# Patient Record
Sex: FEMALE | Race: WHITE | Employment: FULL TIME | URBAN - METROPOLITAN AREA
[De-identification: names, ages, dates, MRNs, and addresses within clinical notes are randomized per-mention and may not be internally consistent; named-entity substitution may affect disease eponyms.]

---

## 2018-03-15 NOTE — PROGRESS NOTES
SUBJECTIVE:   Radha Navarro is a 37 year old female who presents to clinic today for the following health issues:    Chief Complaint   Patient presents with     Derm Problem     rash prefers cream RX to pill     Contraception     discuss IUD       Rash  Onset: 1 year    Description:   Location: left lower leg  Character: blotchy, red  Itching (Pruritis): YES    Progression of Symptoms:  same and intermittent    Accompanying Signs & Symptoms:  Fever: no   Body aches or joint pain: no   Sore throat symptoms: no   Recent cold symptoms: no     History:   Previous similar rash: YES    Precipitating factors:   Exposure to similar rash: no   New exposures: None   Recent travel: no     Alleviating factors:  Salt baths    Therapies Tried and outcome: cortisone cream,salt baths        Problem list and histories reviewed & adjusted, as indicated.  Additional history: as documented    Patient Active Problem List   Diagnosis     CARDIOVASCULAR SCREENING; LDL GOAL LESS THAN 160     HDL lipoprotein deficiency     Ganglion cyst of wrist, Left side, recurrent     Migraines     Cervical pain     Headache     Transient insomnia     Past Surgical History:   Procedure Laterality Date     HC EXCIS PRIMARY GANGLION WRIST      x2 on left wrist; x1 on right side (2011)     TONSILLECTOMY & ADENOIDECTOMY  Age 23       Social History   Substance Use Topics     Smoking status: Former Smoker     Packs/day: 0.00     Types: Cigarettes     Quit date: 8/16/2013     Smokeless tobacco: Never Used     Alcohol use No      Comment: SOBER SINCE 9TH GRADE     Family History   Problem Relation Age of Onset     Lipids Father      DIABETES Maternal Grandmother      DIABETES Maternal Grandfather      DIABETES Paternal Grandmother      DIABETES Paternal Grandfather      CANCER Mother      Breast Cancer Maternal Aunt      CANCER Other      cousin has cervical cancer/cousin has cervical cancer     CANCER Maternal Aunt      cervical cancer     CANCER Maternal  Aunt      cervical cancer           Reviewed and updated as needed this visit by clinical staff  Tobacco  Allergies  Meds  Med Hx  Surg Hx  Fam Hx  Soc Hx      Reviewed and updated as needed this visit by Provider         ROS:  Constitutional, skin systems are negative, except as otherwise noted.    OBJECTIVE:                                                    /81 (BP Location: Left arm, Patient Position: Sitting, Cuff Size: Adult Regular)  Pulse 74  Temp 98.1  F (36.7  C) (Tympanic)  Resp 16  Wt 142 lb (64.4 kg)  SpO2 100%  BMI 23.45 kg/m2  Body mass index is 23.45 kg/(m^2).  GENERAL APPEARANCE: healthy, alert and no distress  SKIN: an erythematous plaque of the lateral left lower leg  PSYCH: mentation appears normal and affect normal/bright       ASSESSMENT:                                                      1. Dermatitis    2. Encounter for other general counseling or advice on contraception         PLAN:                                                    TCS for presumptive eczema plaque. Discussed treatment and prevention of eczema today. She has a history of possible allergies and family history of eczema.    Referral for IUD placement.     The patient was in agreement with the plan today and had no questions or concerns prior to leaving the clinic.     Dona Murillo PA-C  [facility]

## 2018-03-16 ENCOUNTER — OFFICE VISIT (OUTPATIENT)
Dept: FAMILY MEDICINE | Facility: CLINIC | Age: 38
End: 2018-03-16
Payer: COMMERCIAL

## 2018-03-16 VITALS
TEMPERATURE: 98.1 F | HEART RATE: 74 BPM | BODY MASS INDEX: 23.45 KG/M2 | SYSTOLIC BLOOD PRESSURE: 122 MMHG | OXYGEN SATURATION: 100 % | RESPIRATION RATE: 16 BRPM | WEIGHT: 142 LBS | DIASTOLIC BLOOD PRESSURE: 81 MMHG

## 2018-03-16 DIAGNOSIS — L30.9 DERMATITIS: Primary | ICD-10-CM

## 2018-03-16 DIAGNOSIS — Z30.09 ENCOUNTER FOR OTHER GENERAL COUNSELING OR ADVICE ON CONTRACEPTION: ICD-10-CM

## 2018-03-16 PROCEDURE — 99213 OFFICE O/P EST LOW 20 MIN: CPT | Performed by: PHYSICIAN ASSISTANT

## 2018-03-16 RX ORDER — MOMETASONE FUROATE 1 MG/G
CREAM TOPICAL
Qty: 45 G | Refills: 1 | Status: SHIPPED | OUTPATIENT
Start: 2018-03-16 | End: 2020-07-28

## 2018-03-16 ASSESSMENT — ANXIETY QUESTIONNAIRES
GAD7 TOTAL SCORE: 0
2. NOT BEING ABLE TO STOP OR CONTROL WORRYING: NOT AT ALL
6. BECOMING EASILY ANNOYED OR IRRITABLE: NOT AT ALL
5. BEING SO RESTLESS THAT IT IS HARD TO SIT STILL: NOT AT ALL
7. FEELING AFRAID AS IF SOMETHING AWFUL MIGHT HAPPEN: NOT AT ALL
3. WORRYING TOO MUCH ABOUT DIFFERENT THINGS: NOT AT ALL
IF YOU CHECKED OFF ANY PROBLEMS ON THIS QUESTIONNAIRE, HOW DIFFICULT HAVE THESE PROBLEMS MADE IT FOR YOU TO DO YOUR WORK, TAKE CARE OF THINGS AT HOME, OR GET ALONG WITH OTHER PEOPLE: NOT DIFFICULT AT ALL
1. FEELING NERVOUS, ANXIOUS, OR ON EDGE: NOT AT ALL

## 2018-03-16 ASSESSMENT — PATIENT HEALTH QUESTIONNAIRE - PHQ9: 5. POOR APPETITE OR OVEREATING: NOT AT ALL

## 2018-03-16 NOTE — NURSING NOTE
"Chief Complaint   Patient presents with     Derm Problem     rash prefers cream RX to pill     Contraception     discuss IUD       Initial /81 (BP Location: Left arm, Patient Position: Sitting, Cuff Size: Adult Regular)  Pulse 74  Temp 98.1  F (36.7  C) (Tympanic)  Resp 16  Wt 142 lb (64.4 kg)  SpO2 100%  BMI 23.45 kg/m2 Estimated body mass index is 23.45 kg/(m^2) as calculated from the following:    Height as of 8/16/16: 5' 5.25\" (1.657 m).    Weight as of this encounter: 142 lb (64.4 kg).  Medication Reconciliation: complete    "

## 2018-03-16 NOTE — MR AVS SNAPSHOT
After Visit Summary   3/16/2018    Radha Navarro    MRN: [id number]           Patient Information     Date Of Birth          1980        Visit Information        Provider Department      3/16/2018 12:40 PM Dona Murillo PA-C [facility]        Today's Diagnoses     Encounter for other general counseling or advice on contraception    -  1    Dermatitis           Follow-ups after your visit        Additional Services     OB/GYN REFERRAL       Your provider has referred you to:    FMG: [facility] [phone]   [email or web address]  FMG: [facility] [phone]   [email or web address]    Please be aware that coverage of these services is subject to the terms and limitations of your health insurance plan.  Call member services at your health plan with any benefit or coverage questions.      Please bring the following with you to your appointment:    (1) Any X-Rays, CTs or MRIs which have been performed.  Contact the facility where they were done to arrange for  prior to your scheduled appointment.   (2) List of current medications   (3) This referral request   (4) Any documents/labs given to you for this referral                  Who to contact     Normal or non-critical lab and imaging results will be communicated to you by MyChart, letter or phone within 4 business days after the clinic has received the results. If you do not hear from us within 7 days, please contact the clinic through MyChart or phone. If you have a critical or abnormal lab result, we will notify you by phone as soon as possible.  Submit refill requests through [employer]illa or call your pharmacy and they will forward the refill request to us. Please allow 3 business days for your refill to be completed.          If you need to speak with a  for additional information , please call: [phone]              Additional Information About Your Visit        MyChart Information     [employer]ap (formerly [employer]) gives you secure access to your electronic health record. If you see a primary care provider, you can also send messages to your care team and make appointments. If you have questions, please call your primary care clinic.  If you do not have a primary care provider, please call [phone] and they will assist you.        Care EveryWhere ID     This is your Care EveryWhere ID. This could be used by other organizations to access your [place] medical records  [id number]        Your Vitals Were     Pulse Temperature Respirations Pulse Oximetry BMI (Body Mass Index)       74 98.1  F (36.7  C) (Tympanic) 16 100% 23.45 kg/m2        Blood Pressure from Last 3 Encounters:   03/16/18 122/81   08/16/16 113/72   08/11/16 115/71    Weight from Last 3 Encounters:   03/16/18 142 lb (64.4 kg)   08/16/16 135 lb 9.6 oz (61.5 kg)   08/11/16 138 lb 6.4 oz (62.8 kg)              We Performed the Following     OB/GYN REFERRAL          Today's Medication Changes          These changes are accurate as of 3/16/18  1:27 PM.  If you have any questions, ask your nurse or doctor.               Start taking these medicines.        Dose/Directions    mometasone 0.1 % cream   Commonly known as:  ELOCON   Used for:  Dermatitis   Started by:  Dona Murillo PA-C        Apply sparingly to affected area twice daily x5-10 days as needed.  Do not apply to face.   Quantity:  45 g   Refills:  1            Where to get your medicines      These medications were sent to [facility] - [address] [facility]  [address] [facility] [address] [place] [place] [address]     Phone:  [phone]     mometasone 0.1 % cream                Primary Care Provider Office Phone # Fax #    Dona Murillo PA-C [phone] [address] [place] [address]        Equal Access to Services     RODRIGO FULLER AH: ari Bowman  kathy jordynki terrazasattila song. So [facility] [phone].    ATENCIÓN: Si dawit gabriel, tiene a hale disposición servicios gratuitos de asistencia lingüística. Llame al [phone].    We comply with applicable federal civil rights laws and [place] laws. We do not discriminate on the basis of race, color, national origin, age, disability, sex, sexual orientation, or gender identity.            Thank you!     Thank you for choosing [facility]  for your care. Our goal is always to provide you with excellent care. Hearing back from our patients is one way we can continue to improve our services. Please take a few minutes to complete the written survey that you may receive in the mail after your visit with us. Thank you!             Your Updated Medication List - Protect others around you: Learn how to safely use, store and throw away your medicines at www.disposemymeds.org.          This list is accurate as of 3/16/18  1:27 PM.  Always use your most recent med list.                   Brand Name Dispense Instructions for use Diagnosis    mometasone 0.1 % cream    ELOCON    45 g    Apply sparingly to affected area twice daily x5-10 days as needed.  Do not apply to face.    Dermatitis

## 2018-03-17 ASSESSMENT — ANXIETY QUESTIONNAIRES: GAD7 TOTAL SCORE: 0

## 2018-03-17 ASSESSMENT — PATIENT HEALTH QUESTIONNAIRE - PHQ9: SUM OF ALL RESPONSES TO PHQ QUESTIONS 1-9: 0

## 2018-03-19 ENCOUNTER — OFFICE VISIT (OUTPATIENT)
Dept: OBGYN | Facility: CLINIC | Age: 38
End: 2018-03-19
Payer: COMMERCIAL

## 2018-03-19 VITALS
HEART RATE: 69 BPM | OXYGEN SATURATION: 100 % | DIASTOLIC BLOOD PRESSURE: 70 MMHG | BODY MASS INDEX: 23.81 KG/M2 | WEIGHT: 144.2 LBS | SYSTOLIC BLOOD PRESSURE: 113 MMHG

## 2018-03-19 DIAGNOSIS — N94.6 DYSMENORRHEA: Primary | ICD-10-CM

## 2018-03-19 DIAGNOSIS — Z30.430 ENCOUNTER FOR IUD INSERTION: ICD-10-CM

## 2018-03-19 PROCEDURE — 58300 INSERT INTRAUTERINE DEVICE: CPT | Performed by: OBSTETRICS & GYNECOLOGY

## 2018-03-19 NOTE — MR AVS SNAPSHOT
After Visit Summary   3/19/2018    Radha Navarro    MRN: [id number]           Patient Information     Date Of Birth          1980        Visit Information        Provider Department      3/19/2018 8:15 AM Agbeh, Cephas Mawuena, MD [facility]        Today's Diagnoses     Dysmenorrhea    -  1    Encounter for IUD insertion           Follow-ups after your visit        Your next 10 appointments already scheduled     May 03, 2018  9:30 AM CDT   Office Visit with Cephas Mawuena Agbeh, MD   [facility] ([facility])    [address] [facility] [id number] [phone]           Bring a current list of meds and any records pertaining to this visit. For Physicals, please bring immunization records and any forms needing to be filled out. Please arrive 10 minutes early to complete paperwork.              Who to contact     If you have questions or need follow up information about today's clinic visit or your schedule please contact [facility] directly at [phone].  Normal or non-critical lab and imaging results will be communicated to you by Bocandyhart, letter or phone within 4 business days after the clinic has received the results. If you do not hear from us within 7 days, please contact the clinic through Webrazzit or phone. If you have a critical or abnormal lab result, we will notify you by phone as soon as possible.  Submit refill requests through [employer] or call your pharmacy and they will forward the refill request to us. Please allow 3 business days for your refill to be completed.          Additional Information About Your Visit        Bocandyhart Information     [employer] gives you secure access to your electronic health record. If you see a primary care provider, you can also send messages to your care team and make appointments. If you have questions, please call your primary care clinic.  If you do not have a primary care provider,  please call [phone] and they will assist you.        Care EveryWhere ID     This is your Care EveryWhere ID. This could be used by other organizations to access your [place] medical records  [id number]        Your Vitals Were     Pulse Last Period Pulse Oximetry Breastfeeding? BMI (Body Mass Index)       69 03/13/2018 100% No 23.81 kg/m2        Blood Pressure from Last 3 Encounters:   03/19/18 113/70   03/16/18 122/81   08/16/16 113/72    Weight from Last 3 Encounters:   03/19/18 144 lb 3.2 oz (65.4 kg)   03/16/18 142 lb (64.4 kg)   08/16/16 135 lb 9.6 oz (61.5 kg)              We Performed the Following     C KYLEENA IUD 19.5 MG     INSERTION INTRAUTERINE DEVICE        Primary Care Provider Office Phone # Fax #    Dona Murillo PA-C [phone]       [address] [place] [address]        Equal Access to Services     [facility]: Hadii aad ku hadasho Soomaali, waaxda luqadaha, qaybta kaalmada adeegyada, waxay idiin hayjohnn sedrick kharamaame bowie . So [facility] [phone].    ATENCIÓN: Si habla español, tiene a hale disposición servicios gratuitos de asistencia lingüística. Llame al [phone].    We comply with applicable federal civil rights laws and [place] laws. We do not discriminate on the basis of race, color, national origin, age, disability, sex, sexual orientation, or gender identity.            Thank you!     Thank you for choosing [facility]  for your care. Our goal is always to provide you with excellent care. Hearing back from our patients is one way we can continue to improve our services. Please take a few minutes to complete the written survey that you may receive in the mail after your visit with us. Thank you!             Your Updated Medication List - Protect others around you: Learn how to safely use, store and throw away your medicines at www.disposemymeds.org.          This list is accurate as of 3/19/18  9:04 AM.  Always use your most recent med  list.                   Brand Name Dispense Instructions for use Diagnosis    mometasone 0.1 % cream    ELOCON    45 g    Apply sparingly to affected area twice daily x5-10 days as needed.  Do not apply to face.    Dermatitis

## 2018-03-19 NOTE — PROGRESS NOTES
Radha Navarro is a 37 year old  who presents today requesting placement of an KYLEENA iud.    The patient meets and is agreeable to the following conditions:  She is not interested in conception in the near future.   She currently is in a stable, monogamous relationship.   There is no previous history of pelvic inflammatory disease.   There is no previous history of ectopic pregnancy.   She is willing to check monthly for the IUD string.   There is no history of unresolved abnormal uterine bleeding.   There is no history of an unresolved abnormal PAP smear.   She has no history of diabetes, AIDS, leukemia, IV drug use or chronic steroid use.   She is willing to return annually for PAP smears.   She denies the possibility of pregnancy.At the end of her menses.      We discussed risks, benefits, and alternatives including but not limited to:   Possibility of pregnancy and ectopic pregnancy.  Possibility of pelvic inflammatory disease, particularly with new partners.  Risk of uterine perforation or IUD expulsion.  Possibility of difficult removal.  Spotting or heavy bleeding.  Cramping, pain or infection during or after insertion.    The patient was given patient information on the IUD and the patient education brochure from the .  The patient has given consent to proceed with placement of the IUD.  She wishes to proceed.  All questions answered.      PROCEDURE:  Type of IUD: KYLEENA      She is placed in a dorsal lithotomy potion and a pelvic exam is performed to determine the position of the uterus.  The cervix is identified and cleaned with betadine.Cervical block with 10 cc lidocaine.  A single tooth tenaculum is applied to the anterior lip of the cervix for stabilization.   The uterus sounded to 7.5 cm. (Target sound depth is 6.5 cm to 8.5 cm.)  The IUD insertion tube is prepared to manufacturers recommendations and inserted into the uterus under sterile conditions in the usual fashion.  The IUD  string is then cut to 3.5 cm.    The patient tolerated this procedure without immediate complication.  The patient is to return or call immediately for any unexplained fever, abdominal or pelvic pain, excessive bleeding, possibility of pregnancy, foul-smelling discharge, sense that the IUD has been expelled.  All questions were answered.    Return to clinic in 1 month for IUD check      ICD-10-CM    1. Dysmenorrhea N94.6 INSERTION INTRAUTERINE DEVICE     C KYLEENA IUD 19.5 MG   2. Encounter for IUD insertion Z30.430 INSERTION INTRAUTERINE DEVICE     C KYLEENA IUD 19.5 MG     CEPHAS AGBEH, MD.

## 2018-03-19 NOTE — NURSING NOTE
"Chief Complaint   Patient presents with     Consult     Heavy painful periods discussed with other doctors. Wants IUD       Initial /70 (BP Location: Left arm, Cuff Size: Adult Regular)  Pulse 69  Wt 144 lb 3.2 oz (65.4 kg)  LMP 03/13/2018  SpO2 100%  Breastfeeding? No  BMI 23.81 kg/m2 Estimated body mass index is 23.81 kg/(m^2) as calculated from the following:    Height as of 8/16/16: 5' 5.25\" (1.657 m).    Weight as of this encounter: 144 lb 3.2 oz (65.4 kg).  Medication Reconciliation: complete   JULIANA Jacobs 3/19/2018         "

## 2018-06-28 ENCOUNTER — OFFICE VISIT (OUTPATIENT)
Dept: OBGYN | Facility: CLINIC | Age: 38
End: 2018-06-28
Payer: COMMERCIAL

## 2018-06-28 VITALS
TEMPERATURE: 99.1 F | BODY MASS INDEX: 23.65 KG/M2 | HEART RATE: 75 BPM | SYSTOLIC BLOOD PRESSURE: 118 MMHG | WEIGHT: 143.2 LBS | DIASTOLIC BLOOD PRESSURE: 76 MMHG

## 2018-06-28 DIAGNOSIS — Z30.431 IUD CHECK UP: Primary | ICD-10-CM

## 2018-06-28 PROCEDURE — 99214 OFFICE O/P EST MOD 30 MIN: CPT | Performed by: OBSTETRICS & GYNECOLOGY

## 2018-06-28 NOTE — MR AVS SNAPSHOT
After Visit Summary   6/28/2018    Radha Navarro    MRN: [id number]           Patient Information     Date Of Birth          1980        Visit Information        Provider Department      6/28/2018 2:00 PM Agbeh, Cephas Mawuena, MD [facility]        Care Instructions                                                        If you have any questions regarding your visit, Please contact your care team.    [facility] Access Services: [phone]      [facility] CLINIC HOURS TELEPHONE NUMBER   Cephas Agbeh, M.D.    JONATHAN Dorado,     GANESH Crandall RN             Monday-[place]    8:00a.m-4:45 p.m    Tuesday--[place]     8:00a.m-4:45 p.m.    Thursday-[place]    8:00a.m-4:45 p.m.    Friday-[place]    8:00a.m-4:45 p.m    [facility]   [address] [place] [address]   [phone]-Ask for [facility]   Fax [phone]   Sjskqie-[phone]     [facility]   [address] [place] [address] [place] [address]   [phone]     [facility]   [address] [facility] [address]   [phone]   Dfnvpcx-[phone]    Urgent Care locations:    [facility]  Monday-Friday   5 pm - 9 pm   Saturday and Sunday    9 am - 5 pm      Monday-Friday    11 pm - 9 pm  Saturday and Sunday   9 am - 5 pm    [phone]     If you need a medication refill, please contact your pharmacy. Please allow 3 business days for your refill to be completed.  As always, Thank you for trusting us with your healthcare needs!            Follow-ups after your visit        Your next 10 appointments already scheduled     Jun 28, 2018  2:00 PM CDT   Office Visit with Cephas Mawuena Agbeh, MD   [facility]    [address] [facility] [id number] [phone]           Bring a current list of meds and any records pertaining to this visit. For  Physicals, please bring immunization records and any forms needing to be filled out. Please arrive 10 minutes early to complete paperwork.            Aug 01, 2018 12:00 PM CDT   (Arrive by 11:30 AM)   New Visit with Tana Cronin LPC   [facility] ([facility])    [address] [facility] [address] [phone]            Aug 08, 2018 11:00 AM CDT   Return Visit with Tana Cronin LPC   [facility] ([facility])    [address] [facility] [facility] [address] [phone]              Who to contact     If you have questions or need follow up information about today's clinic visit or your schedule please contact [facility] directly at [phone].  Normal or non-critical lab and imaging results will be communicated to you by MyChart, letter or phone within 4 business days after the clinic has received the results. If you do not hear from us within 7 days, please contact the clinic through Mail[employer]Ru Grou[employer] or phone. If you have a critical or abnormal lab result, we will notify you by phone as soon as possible.  Submit refill requests through [employer] or call your pharmacy and they will forward the refill request to us. Please allow 3 business days for your refill to be completed.          Additional Information About Your Visit        MyChart Information     [employer] gives you secure access to your electronic health record. If you see a primary care provider, you can also send messages to your care team and make appointments. If you have questions, please call your primary care clinic.  If you do not have a primary care provider, please call [phone] and they will assist you.        Care EveryWhere ID     This is your Care EveryWhere ID. This could be used by other organizations to access your [place] medical records  [id number]        Your Vitals Were     Pulse Temperature Last Period BMI (Body Mass Index)          75 99.1  F (37.3  C)  (Tympanic) 06/21/2018 23.65 kg/m2         Blood Pressure from Last 3 Encounters:   06/28/18 118/76   03/19/18 113/70   03/16/18 122/81    Weight from Last 3 Encounters:   06/28/18 143 lb 3.2 oz (65 kg)   03/19/18 144 lb 3.2 oz (65.4 kg)   03/16/18 142 lb (64.4 kg)              Today, you had the following     No orders found for display       Primary Care Provider Office Phone # Fax #    Dona Murillo PA-C [phone]       [address]  [facility] [address]        Equal Access to Services     JACINTA FULLER : Hadii stacy flores hadasho Soomaali, waaxda luqadaha, qaybta kaalmada adeegyada, attila bowie . So [facility] [phone].    ATENCIÓN: Si habla español, tiene a hale disposición servicios gratuitos de asistencia lingüística. Llame al [phone].    We comply with applicable federal civil rights laws and [place] laws. We do not discriminate on the basis of race, color, national origin, age, disability, sex, sexual orientation, or gender identity.            Thank you!     Thank you for choosing [facility]  for your care. Our goal is always to provide you with excellent care. Hearing back from our patients is one way we can continue to improve our services. Please take a few minutes to complete the written survey that you may receive in the mail after your visit with us. Thank you!             Your Updated Medication List - Protect others around you: Learn how to safely use, store and throw away your medicines at www.disposemymeds.org.          This list is accurate as of 6/28/18  1:58 PM.  Always use your most recent med list.                   Brand Name Dispense Instructions for use Diagnosis    mometasone 0.1 % cream    ELOCON    45 g    Apply sparingly to affected area twice daily x5-10 days as needed.  Do not apply to face.    Dermatitis

## 2018-06-28 NOTE — PROGRESS NOTES
Radha is a 38 year old  here for IUD surveillance.  She had the KYLEENA IUD inserted 3 months ago.    She has not had any problems with the IUD.  No unusual discharge or bleeding.  No cramping.    PMH: Her past medical, surgical, and obstetric histories were reviewed and are documented in their appropriate chart areas.    ALL/Meds: Her medication and allergy histories were reviewed and are documented in their appropriate chart areas.    ROS:4 point ROS including Respiratory, CV, GI and , other than that noted in the HPI,  is negative     EXAM:  /76  Pulse 75  Temp 99.1  F (37.3  C) (Tympanic)  Wt 143 lb 3.2 oz (65 kg)  LMP 2018  BMI 23.65 kg/m2  General:  WNWD female, NAD  Alert  Oriented x 3  Gait:  Normal  Skin:  Normal skin turgor  HEENT:  NC/AT, EOMI  Abdomen:  Non-tender, non-distended.  Vulva: No external lesions, normal hair distribution, no adenopathy  BUS:  Normal, no masses noted  Urethra:  No hypermobility seen  Urethral meatus:  No masses noted  Vagina: Moist, pink, no abnormal discharge, well rugated, no lesions  Cervix: Smooth, pink, no visible lesions, no CMT, IUD strings seen and trimmed.  Uterus: Normal size, anteverted, non-tender, mobile  Ovaries: No mass, non-tender, mobile  Perianal:  No masses noted  Extremities:  No clubbing, no cyanosis and no edema.        ASSESSMENT:    ICD-10-CM    1. IUD check up Z30.431         PLAN:  She does not have any apparent contraindications for continued use of the IUD.        25 minutes was spent face to face with the patient today discussing her history, diagnosis, and follow-up plan as noted above.  Over 50% of the visit was spent in counseling and coordination of care.    Total Visit Time: 30 minutes.

## 2018-08-01 ENCOUNTER — OFFICE VISIT (OUTPATIENT)
Dept: PSYCHOLOGY | Facility: CLINIC | Age: 38
End: 2018-08-01
Attending: PHYSICIAN ASSISTANT
Payer: COMMERCIAL

## 2018-08-01 ENCOUNTER — FCC EXTENDED DOCUMENTATION (OUTPATIENT)
Dept: PSYCHOLOGY | Facility: CLINIC | Age: 38
End: 2018-08-01

## 2018-08-01 DIAGNOSIS — F41.8 OTHER SPECIFIED ANXIETY DISORDERS: Primary | ICD-10-CM

## 2018-08-01 PROCEDURE — 90834 PSYTX W PT 45 MINUTES: CPT | Performed by: COUNSELOR

## 2018-08-01 ASSESSMENT — ANXIETY QUESTIONNAIRES
GAD7 TOTAL SCORE: 6
5. BEING SO RESTLESS THAT IT IS HARD TO SIT STILL: NOT AT ALL
6. BECOMING EASILY ANNOYED OR IRRITABLE: SEVERAL DAYS
3. WORRYING TOO MUCH ABOUT DIFFERENT THINGS: SEVERAL DAYS
1. FEELING NERVOUS, ANXIOUS, OR ON EDGE: SEVERAL DAYS
7. FEELING AFRAID AS IF SOMETHING AWFUL MIGHT HAPPEN: SEVERAL DAYS
2. NOT BEING ABLE TO STOP OR CONTROL WORRYING: SEVERAL DAYS
IF YOU CHECKED OFF ANY PROBLEMS ON THIS QUESTIONNAIRE, HOW DIFFICULT HAVE THESE PROBLEMS MADE IT FOR YOU TO DO YOUR WORK, TAKE CARE OF THINGS AT HOME, OR GET ALONG WITH OTHER PEOPLE: NOT DIFFICULT AT ALL

## 2018-08-01 ASSESSMENT — PATIENT HEALTH QUESTIONNAIRE - PHQ9: 5. POOR APPETITE OR OVEREATING: SEVERAL DAYS

## 2018-08-01 NOTE — MR AVS SNAPSHOT
MRN:[id number]                      After Visit Summary   8/1/2018    Radha Navarro    MRN: [id number]           Visit Information        Provider Department      8/1/2018 12:00 PM [facility] Cronin [facility]      MyChart Information     MyChart gives you secure access to your electronic health record. If you see a primary care provider, you can also send messages to your care team and make appointments. If you have questions, please call your primary care clinic.  If you do not have a primary care provider, please call [phone] and they will assist you.        Care EveryWhere ID     This is your Care EveryWhere ID. This could be used by other organizations to access your [facility] medical records  [id number]        Equal Access to Services     RODRIGO FULLER : Espinoza Shah, wamoon chavez, qaki kaalelroy phillips, attila humphrey. So [facility] [phone].    ATENCIÓN: Si habla español, tiene a hale disposición servicios gratuitos de asistencia lingüística. Llame al [phone].    We comply with applicable federal civil rights laws and [place] laws. We do not discriminate on the basis of race, color, national origin, age, disability, sex, sexual orientation, or gender identity.

## 2018-08-02 ASSESSMENT — PATIENT HEALTH QUESTIONNAIRE - PHQ9: SUM OF ALL RESPONSES TO PHQ QUESTIONS 1-9: 1

## 2018-08-02 ASSESSMENT — ANXIETY QUESTIONNAIRES: GAD7 TOTAL SCORE: 6

## 2018-08-16 ENCOUNTER — OFFICE VISIT (OUTPATIENT)
Dept: BEHAVIORAL HEALTH | Facility: CLINIC | Age: 38
End: 2018-08-16
Payer: COMMERCIAL

## 2018-08-16 DIAGNOSIS — F41.9 ANXIETY: Primary | ICD-10-CM

## 2018-08-16 DIAGNOSIS — F33.1 MAJOR DEPRESSIVE DISORDER, RECURRENT EPISODE, MODERATE (H): ICD-10-CM

## 2018-08-16 PROCEDURE — 90834 PSYTX W PT 45 MINUTES: CPT | Performed by: MARRIAGE & FAMILY THERAPIST

## 2018-08-16 NOTE — MR AVS SNAPSHOT
After Visit Summary   8/16/2018    Radha Navarro    MRN: [id number]           Patient Information     Date Of Birth          1980        Visit Information        Provider Department      8/16/2018 9:00 AM Alejandra Fall [facility]        Today's Diagnoses     Anxiety    -  1    Major depressive disorder, recurrent episode, moderate (H)           Follow-ups after your visit        Your next 10 appointments already scheduled     Sep 07, 2018  1:30 PM CDT   New Visit with Alejandra Fall   [facility] ([facility])    [address] [address] [phone]              Who to contact     If you have questions or need follow up information about today's clinic visit or your schedule please contact [facility] directly at [phone].  Normal or non-critical lab and imaging results will be communicated to you by MyChart, letter or phone within 4 business days after the clinic has received the results. If you do not hear from us within 7 days, please contact the clinic through MyChart or phone. If you have a critical or abnormal lab result, we will notify you by phone as soon as possible.  Submit refill requests through [employer] or call your pharmacy and they will forward the refill request to us. Please allow 3 business days for your refill to be completed.          Additional Information About Your Visit        MyChart Information     [employer] gives you secure access to your electronic health record. If you see a primary care provider, you can also send messages to your care team and make appointments. If you have questions, please call your primary care clinic.  If you do not have a primary care provider, please call [phone] and they will assist you.        Care EveryWhere ID     This is your Care EveryWhere ID. This could be used by other organizations to access your [facility]  records  [id number]         Blood Pressure from Last 3 Encounters:   06/28/18 118/76   03/19/18 113/70   03/16/18 122/81    Weight from Last 3 Encounters:   06/28/18 65 kg (143 lb 3.2 oz)   03/19/18 65.4 kg (144 lb 3.2 oz)   03/16/18 64.4 kg (142 lb)              Today, you had the following     No orders found for display       Primary Care Provider Office Phone # Fax #    Dona Murillo PA-C [phone]       [address]  [place] [address]        Equal Access to Services     [facility]: Hadii aad ku hadasho Soeleanorali, waaxda luqadaha, qaybta kaalmada adeegyada, attila bowie . So [facility] [phone].    ATENCIÓN: Si habla español, tiene a hale disposición servicios gratuitos de asistencia lingüística. Llame al [phone].    We comply with applicable federal civil rights laws and [place] laws. We do not discriminate on the basis of race, color, national origin, age, disability, sex, sexual orientation, or gender identity.            Thank you!     Thank you for choosing [facility]  for your care. Our goal is always to provide you with excellent care. Hearing back from our patients is one way we can continue to improve our services. Please take a few minutes to complete the written survey that you may receive in the mail after your visit with us. Thank you!             Your Updated Medication List - Protect others around you: Learn how to safely use, store and throw away your medicines at www.disposemymeds.org.          This list is accurate as of 8/16/18 11:59 PM.  Always use your most recent med list.                   Brand Name Dispense Instructions for use Diagnosis    mometasone 0.1 % cream    ELOCON    45 g    Apply sparingly to affected area twice daily x5-10 days as needed.  Do not apply to face.    Dermatitis

## 2018-08-16 NOTE — PROGRESS NOTES
[facility]      Behavioral Health Clinician Progress Note    Patient Name: Radha Navarro           Service Type:  Individual      Service Location:   Face to Face in Clinic     Session Start Time: 9:00  Session End Time: 10:00      Session Length: 53 - 60      Attendees: Patient    Visit Activities (Refresh list every visit): NEW    Diagnostic Assessment Date: To be completed   Treatment Plan Review Date: To be completed   See Flowsheets for today's PHQ-9 and MARK-7 results  Previous PHQ-9:   PHQ-9 SCORE 7/26/2016 3/16/2018 8/1/2018   Total Score MyChart - - -   Total Score 5 0 1     Previous MARK-7:   MARK-7 SCORE 7/19/2016 3/16/2018 8/1/2018   Total Score - - -   Total Score 4 0 6       OSCAR LEVEL:  OSCAR Score (Last Two) 10/27/2011   OSCAR Raw Score 46   Activation Score 75.3   OSCAR Level 4       DATA  Extended Session (60+ minutes): No  Interactive Complexity: No  Crisis: No  H Patient: No    Treatment Objective(s) Addressed in This Session:  Target Behavior(s): disease management/lifestyle changes related to depression and anxiety    Depressed Mood: Increase interest, engagement, and pleasure in doing things  Decrease frequency and intensity of feeling down, depressed, hopeless  Improve quantity and quality of night time sleep / decrease daytime naps  Feel less tired and more energy during the day   Improve diet, appetite, mindful eating, and / or meal planning  Identify negative self-talk and behaviors: challenge core beliefs, myths, and actions  Improve concentration, focus, and mindfulness in daily activities   Feel less fidgety, restless or slow in daily activities / interpersonal interactions  Decrease thoughts that you'd be better off dead or of suicide / self-harm  Anxiety: will experience a reduction in anxiety, will develop more effective coping skills to manage anxiety symptoms, will develop healthy cognitive patterns and beliefs and will increase ability to  function adaptively    Current Stressors / Issues:  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 Patient reports brother Juan Luis is a sever alcoholic and has self destructive behaviors. Patient reports her parents currently have custody of Juan Luis's son after he  and has court involvement due to his Chemical Dependency. Patient reports she has not spoken to her brother for 3 years since she called the police on him for driving intoxicated. Patient reports increased anxiety symptoms with seeing her parents continue to enable her brother and as she is involved in the court process for custody of her nephew. Patient she has no children and has never been .      Progress on Treatment Objective(s) / Homework:  New Objective established this session - PREPARATION (Decided to change - considering how); Intervened by negotiating a change plan and determining options / strategies for behavior change, identifying triggers, exploring social supports, and working towards setting a date to begin behavior change    Also provided psychoeducation about behavioral health condition, symptoms, and treatment options    Care Plan review completed: Yes    Medication Review:  No current psychiatric medications prescribed    Medication Compliance:  NA    Changes in Health Issues:   None reported    Chemical Use Review:   Substance Use: Chemical use reviewed, no active concerns identified      Tobacco Use: No current tobacco use.      Assessment: Current Emotional / Mental Status (status of significant symptoms):  Risk status (Self / Other harm or suicidal ideation)  Patient denies a history of suicidal ideation, suicide attempts, self-injurious behavior, homicidal ideation, homicidal behavior and and other safety concerns  Patient denies current fears or concerns for personal safety.  Patient denies current or recent suicidal ideation or behaviors.  Patient denies current or recent homicidal  ideation or behaviors.  Patient denies current or recent self injurious behavior or ideation.  Patient denies other safety concerns.  A safety and risk management plan has not been developed at this time, however patient was encouraged to call [place] [phone] should there be a change in any of these risk factors.    Appearance:   Appropriate   Eye Contact:   Good   Psychomotor Behavior: Normal   Attitude:   Cooperative   Orientation:   All  Speech   Rate / Production: Normal    Volume:  Normal   Mood:    Anxious  Depressed   Affect:    Worrisome   Thought Content:  Rumination   Thought Form:  Coherent  Logical   Insight:    Good     Diagnoses:  1. Anxiety    2. Major depressive disorder, recurrent episode, moderate (H)        Collateral Reports Completed:  Not Applicable    Plan: (Homework, other):  Patient was given information about behavioral services and encouraged to schedule a follow up appointment with the clinic [facility] 9/7/18.  She was also given information about mental health symptoms and treatment options .  CD Recommendations: No indications of CD issues.  KERVIN Mcneill, [facility]

## 2018-09-25 NOTE — PROGRESS NOTES
Progress Note - Initial Session    Client Name:  Radha Navarro Date: 8/1/2018         Service Type: Individual      Session Start Time: 12:00pm  Session End Time: 12:50pm      Session Length: 38 - 52      Session #: 1     Attendees: Client attended alone         Diagnostic Assessment in progress.  Unable to complete documentation at the conclusion of the first session due to unable to get through all of the background information during the appointment. Spent time discussing family history of substance use and how it has impacted her life and her current functioning. Struggling with sibling ho is using, and had to distance herself from him, causing some discord amongst herself and other family members.      Mental Status Assessment:  Appearance:   Appropriate   Eye Contact:   Good   Psychomotor Behavior: Normal   Attitude:   Cooperative   Orientation:   All  Speech   Rate / Production: Normal    Volume:  Normal   Mood:    Normal  Affect:    Appropriate   Thought Content:  Clear   Thought Form:  Coherent  Logical   Insight:    Fair       Safety Issues and Plan for Safety and Risk Management:  Client denies current fears or concerns for personal safety.  Client denies current or recent suicidal ideation or behaviors.  Client denies current or recent homicidal ideation or behaviors.  Client denies current or recent self injurious behavior or ideation.  Client denies other safety concerns.  A safety and risk management plan has not been developed at this time, however client was given the after-hours number / 911 should there be a change in any of these risk factors.  Client reports there are no firearms in the house.      Diagnostic Criteria:  Mixed anxiety-depressive disorder: clinically significant symptoms of anxiety and depression, but the criteria are not met for either a specific Mood Disorder or a specific Anxiety Disorder.  Anxiety disorder is present, but at this time therapist is unable to  determine whether it is primary.  Further assessment needed.  Client reports the following symptoms of anxiety:   - The person finds it difficult to control the worry.   - Restlessness or feeling keyed up or on edge.    - Being easily fatigued.    - Difficulty concentrating or mind going blank.    - Irritability.    - The focus of the anxiety and worry is not confined to features of an Axis I disorder.   - The anxiety, worry, or physical symptoms cause clinically significant distress or impairment in social, occupational, or other important areas of functioning.    - The disturbance is not due to the direct physiological effects of a substance (e.g., a drug of abuse, a medication) or a general medical condition (e.g., hyperthyroidism) and does not occur exclusively during a Mood Disorder, a Psychotic Disorder, or a Pervasive Developmental Disorder.        DSM5 Diagnoses: (Sustained by DSM5 Criteria Listed Above)  Diagnoses: 300.09 (F41.8) Other Specified Anxiety Disorder   Psychosocial & Contextual Factors: family discord, sibling with addiction  WHODAS 2.0 (12 item)            This questionnaire asks about difficulties due to health conditions. Health conditions  include  disease or illnesses, other health problems that may be short or long lasting,  injuries, mental health or emotional problems, and problems with alcohol or drugs.                     Think back over the past 30 days and answer these questions, thinking about how much  difficulty you had doing the following activities. For each question, please [place] only  one response.    Will complete at next appointment    Collateral Reports Completed:  Not Applicable      PLAN: (Homework, other):  Client stated that she may follow up for ongoing services with [facility].  Continue with individual therapy      Tana Cronin, [facility]

## 2018-11-12 ENCOUNTER — HEALTH MAINTENANCE LETTER (OUTPATIENT)
Age: 38
End: 2018-11-12

## 2019-02-27 ENCOUNTER — OFFICE VISIT (OUTPATIENT)
Dept: PSYCHOLOGY | Facility: CLINIC | Age: 39
End: 2019-02-27
Payer: COMMERCIAL

## 2019-02-27 DIAGNOSIS — F32.89 OTHER SPECIFIED DEPRESSIVE EPISODES: ICD-10-CM

## 2019-02-27 DIAGNOSIS — F41.8 OTHER SPECIFIED ANXIETY DISORDERS: Primary | ICD-10-CM

## 2019-02-27 PROCEDURE — 90791 PSYCH DIAGNOSTIC EVALUATION: CPT | Performed by: COUNSELOR

## 2019-02-27 ASSESSMENT — COLUMBIA-SUICIDE SEVERITY RATING SCALE - C-SSRS
4. HAVE YOU HAD THESE THOUGHTS AND HAD SOME INTENTION OF ACTING ON THEM?: NO
TOTAL  NUMBER OF INTERRUPTED ATTEMPTS PAST 3 MONTHS: NO
TOTAL  NUMBER OF INTERRUPTED ATTEMPTS LIFETIME: NO
2. HAVE YOU ACTUALLY HAD ANY THOUGHTS OF KILLING YOURSELF?: NO
6. HAVE YOU EVER DONE ANYTHING, STARTED TO DO ANYTHING, OR PREPARED TO DO ANYTHING TO END YOUR LIFE?: NO
5. HAVE YOU STARTED TO WORK OUT OR WORKED OUT THE DETAILS OF HOW TO KILL YOURSELF? DO YOU INTEND TO CARRY OUT THIS PLAN?: NO
6. HAVE YOU EVER DONE ANYTHING, STARTED TO DO ANYTHING, OR PREPARED TO DO ANYTHING TO END YOUR LIFE?: NO
1. IN THE PAST MONTH, HAVE YOU WISHED YOU WERE DEAD OR WISHED YOU COULD GO TO SLEEP AND NOT WAKE UP?: YES
TOTAL  NUMBER OF ABORTED OR SELF INTERRUPTED ATTEMPTS PAST 3 MONTHS: NO
1. IN THE PAST MONTH, HAVE YOU WISHED YOU WERE DEAD OR WISHED YOU COULD GO TO SLEEP AND NOT WAKE UP?: NO
REASONS FOR IDEATION LIFETIME: DOES NOT APPLY
TOTAL  NUMBER OF ABORTED OR SELF INTERRUPTED ATTEMPTS PAST LIFETIME: NO
ATTEMPT LIFETIME: NO
ATTEMPT PAST THREE MONTHS: NO
5. HAVE YOU STARTED TO WORK OUT OR WORKED OUT THE DETAILS OF HOW TO KILL YOURSELF? DO YOU INTEND TO CARRY OUT THIS PLAN?: NO
2. HAVE YOU ACTUALLY HAD ANY THOUGHTS OF KILLING YOURSELF LIFETIME?: NO
3. HAVE YOU BEEN THINKING ABOUT HOW YOU MIGHT KILL YOURSELF?: NO
4. HAVE YOU HAD THESE THOUGHTS AND HAD SOME INTENTION OF ACTING ON THEM?: NO
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
## 2019-02-27 NOTE — Clinical Note
Radha Strauss initially came in 6 months ago, but I went on maternity leave the next day. She returned today to begin counseling. Let me know if you have any questions.Thanks,Rula

## 2019-03-13 ENCOUNTER — OFFICE VISIT (OUTPATIENT)
Dept: PSYCHOLOGY | Facility: CLINIC | Age: 39
End: 2019-03-13
Payer: COMMERCIAL

## 2019-03-13 DIAGNOSIS — F41.8 OTHER SPECIFIED ANXIETY DISORDERS: Primary | ICD-10-CM

## 2019-03-13 DIAGNOSIS — F32.89 OTHER SPECIFIED DEPRESSIVE EPISODES: ICD-10-CM

## 2019-03-13 PROCEDURE — 90834 PSYTX W PT 45 MINUTES: CPT | Performed by: COUNSELOR

## 2019-03-20 NOTE — PROGRESS NOTES
Progress Note    Client Name: Radha Navarro  Date: 3/13/2019         Service Type: Individual  Video Visit: No     Session Start Time: 4:40pm  Session End Time: 5:30pm     Session Length: 50 minutes    Session #: 3    Attendees: Client attended alone     Treatment Plan Last Reviewed:   PHQ-9 / MARK-7 : 2/27/2019    DATA  Interactive Complexity: No  Crisis: No       Progress Since Last Session (Related to Symptoms / Goals / Homework):   Symptoms: Improving practicing skills and utilizing them with boyfriend    Homework: Partially completed      Episode of Care Goals: Minimal progress - PREPARATION (Decided to change - considering how); Intervened by negotiating a change plan and determining options / strategies for behavior change, identifying triggers, exploring social supports, and working towards setting a date to begin behavior change     Current / Ongoing Stressors and Concerns:   Working on allowing boyfriend to be more dependent and moving forward in their relationship.      Treatment Objective(s) Addressed in This Session:   compile a list of boundaries that they would like to set with others. friends and boyfrind     Intervention:   DBT: Interpersonal effectiveness: Identifying patterns in relationship that cause her stress and the most effective ways to communicate her needs and boundaries to others without fears that she is hurting their feelings. Explored ways to effectively communicate her needs to others and set appropriate expectations for them and herself, without isolating or withdrawing from them completely.        ASSESSMENT: Current Emotional / Mental Status (status of significant symptoms):   Risk status (Self / Other harm or suicidal ideation)   Client denies current fears or concerns for personal safety.   Client denies current or recent suicidal ideation or behaviors.   Client denies current or recent homicidal ideation or behaviors.   Client  denies current or recent self injurious behavior or ideation.   Client denies other safety concerns.   Client Client reports there has been no change in risk factors since their last session.     Client Client reports there has been no change in protective factors since their last session.     A safety and risk management plan has not been developed at this time, however client was given the after-hours number / 911 should there be a change in any of these risk factors.     Appearance:   Appropriate    Eye Contact:   Good    Psychomotor Behavior: Normal    Attitude:   Cooperative    Orientation:   All   Speech    Rate / Production: Normal     Volume:  Normal    Mood:    Normal   Affect:    Appropriate    Thought Content:  Clear    Thought Form:  Coherent  Logical    Insight:    Fair      Medication Review:   No current psychiatric medications prescribed     Medication Compliance:   NA     Changes in Health Issues:   None reported     Chemical Use Review:   Substance Use: Chemical use reviewed, no active concerns identified      Tobacco Use: No current tobacco use.      Diagnosis:  1. Other specified anxiety disorders    2. Other specified depressive episodes        Collateral Reports Completed:   Not Applicable    PLAN: (Client Tasks / Therapist Tasks / Other)  Continue with individual therapy. Homework to practice communication skills practiced in session          Tana Cronin, REZA

## 2019-04-10 ENCOUNTER — OFFICE VISIT (OUTPATIENT)
Dept: FAMILY MEDICINE | Facility: CLINIC | Age: 39
End: 2019-04-10
Payer: COMMERCIAL

## 2019-04-10 ENCOUNTER — RESULT FOLLOW UP (OUTPATIENT)
Dept: FAMILY MEDICINE | Facility: CLINIC | Age: 39
End: 2019-04-10

## 2019-04-10 VITALS
TEMPERATURE: 96 F | SYSTOLIC BLOOD PRESSURE: 123 MMHG | WEIGHT: 155 LBS | HEIGHT: 65 IN | DIASTOLIC BLOOD PRESSURE: 81 MMHG | BODY MASS INDEX: 25.83 KG/M2 | OXYGEN SATURATION: 100 % | RESPIRATION RATE: 18 BRPM | HEART RATE: 80 BPM

## 2019-04-10 DIAGNOSIS — R87.810 CERVICAL HIGH RISK HPV (HUMAN PAPILLOMAVIRUS) TEST POSITIVE: ICD-10-CM

## 2019-04-10 DIAGNOSIS — Z00.00 ENCOUNTER FOR ROUTINE ADULT HEALTH EXAMINATION WITHOUT ABNORMAL FINDINGS: Primary | ICD-10-CM

## 2019-04-10 DIAGNOSIS — Z11.4 SCREENING FOR HIV (HUMAN IMMUNODEFICIENCY VIRUS): ICD-10-CM

## 2019-04-10 DIAGNOSIS — F41.8 SITUATIONAL ANXIETY: ICD-10-CM

## 2019-04-10 PROCEDURE — 99395 PREV VISIT EST AGE 18-39: CPT | Performed by: PHYSICIAN ASSISTANT

## 2019-04-10 PROCEDURE — G0145 SCR C/V CYTO,THINLAYER,RESCR: HCPCS | Performed by: PHYSICIAN ASSISTANT

## 2019-04-10 PROCEDURE — 87624 HPV HI-RISK TYP POOLED RSLT: CPT | Performed by: PHYSICIAN ASSISTANT

## 2019-04-10 RX ORDER — DIAZEPAM 10 MG
5-10 TABLET ORAL
Qty: 5 TABLET | Refills: 0 | Status: SHIPPED | OUTPATIENT
Start: 2019-04-10 | End: 2020-07-28

## 2019-04-10 RX ORDER — NITROFURANTOIN 25; 75 MG/1; MG/1
CAPSULE ORAL
Refills: 0 | COMMUNITY
Start: 2019-04-09 | End: 2019-04-19

## 2019-04-10 ASSESSMENT — MIFFLIN-ST. JEOR: SCORE: 1386.45

## 2019-04-10 ASSESSMENT — ANXIETY QUESTIONNAIRES

## 2019-04-10 ASSESSMENT — PATIENT HEALTH QUESTIONNAIRE - PHQ9
SUM OF ALL RESPONSES TO PHQ QUESTIONS 1-9: 0
5. POOR APPETITE OR OVEREATING: NOT AT ALL

## 2019-04-10 NOTE — LETTER
[facility]  [address]  [place] [address]  [phone]        December 20, 2019    Radha Navarro  [address] [place] [address]              Dear Radha Navarro    This is to remind you that your fasting lab is due.    You may call our office at [phone] to schedule an appointment.    Please disregard this notice if you have already had your labs drawn or made an appointment.        Sincerely,        Dona Murillo PA-C

## 2019-04-10 NOTE — LETTER
My Depression Action Plan  Name: Radha Navarro   Date of Birth 1980  Date: 4/10/2019    My doctor: Dona Murillo   My clinic: [facility]  [address]  [place] [id number] [phone]          GREEN    ZONE   Good Control    What it looks like:     Things are going generally well. You have normal up s and down s. You may even feel depressed from time to time, but bad moods usually last less than a day.   What you need to do:  1. Continue to care for yourself (see self care plan)  2. Check your depression survival kit and update it as needed  3. Follow your physician s recommendations including any medication.  4. Do not stop taking medication unless you consult with your physician first.           YELLOW         ZONE Getting Worse    What it looks like:     Depression is starting to interfere with your life.     It may be hard to get out of bed; you may be starting to isolate yourself from others.    Symptoms of depression are starting to last most all day and this has happened for several days.     You may have suicidal thoughts but they are not constant.   What you need to do:     1. Call your care team, your response to treatment will improve if you keep your care team informed of your progress. Yellow periods are signs an adjustment may need to be made.     2. Continue your self-care, even if you have to fake it!    3. Talk to someone in your support network    4. Open up your depression survival kit           RED    ZONE Medical Alert - Get Help    What it looks like:     Depression is seriously interfering with your life.     You may experience these or other symptoms: You can t get out of bed most days, can t work or engage in other necessary activities, you have trouble taking care of basic hygiene, or basic responsibilities, thoughts of suicide or death that will not go away, self-injurious behavior.     What you need to do:  1. Call your care team  and request a same-day appointment. If they are not available (weekends or after hours) call your local crisis line, emergency room or 911.            Depression Self Care Plan / Survival Kit    Self-Care for Depression  Here s the deal. Your body and mind are really not as separate as most people think.  What you do and think affects how you feel and how you feel influences what you do and think. This means if you do things that people who feel good do, it will help you feel better.  Sometimes this is all it takes.  There is also a place for medication and therapy depending on how severe your depression is, so be sure to consult with your medical provider and/ or Behavioral Health Consultant if your symptoms are worsening or not improving.     In order to better manage my stress, I will:    Exercise  Get some form of exercise, every day. This will help reduce pain and release endorphins, the  feel good  chemicals in your brain. This is almost as good as taking antidepressants!  This is not the same as joining a gym and then never going! (they count on that by the way ) It can be as simple as just going for a walk or doing some gardening, anything that will get you moving.      Hygiene   Maintain good hygiene (Get out of bed in the morning, Make your bed, Brush your teeth, Take a shower, and Get dressed like you were going to work, even if you are unemployed).  If your clothes don't fit try to get ones that do.    Diet  I will strive to eat foods that are good for me, drink plenty of water, and avoid excessive sugar, caffeine, alcohol, and other mood-altering substances.  Some foods that are helpful in depression are: complex carbohydrates, B vitamins, flaxseed, fish or fish oil, fresh fruits and vegetables.    Psychotherapy  I agree to participate in Individual Therapy (if recommended).    Medication  If prescribed medications, I agree to take them.  Missing doses can result in serious side effects.  I understand  that drinking alcohol, or other illicit drug use, may cause potential side effects.  I will not stop my medication abruptly without first discussing it with my provider.    Staying Connected With Others  I will stay in touch with my friends, family members, and my primary care provider/team.    Use your imagination  Be creative.  We all have a creative side; it doesn t matter if it s oil painting, sand castles, or mud pies! This will also kick up the endorphins.    Witness Beauty  (AKA stop and smell the roses) Take a look outside, even in mid-winter. Notice colors, textures. Watch the squirrels and birds.     Service to others  Be of service to others.  There is always someone else in need.  By helping others we can  get out of ourselves  and remember the really important things.  This also provides opportunities for practicing all the other parts of the program.    Humor  Laugh and be silly!  Adjust your TV habits for less news and crime-drama and more comedy.    Control your stress  Try breathing deep, massage therapy, biofeedback, and meditation. Find time to relax each day.     My support system    Clinic Contact:  Phone number:    Contact 1:  Phone number:    Contact 2:  Phone number:    Orthodoxy/:  Phone number:    Therapist:  Phone number:    Local crisis center:    Phone number:    Other community support:  Phone number:

## 2019-04-10 NOTE — PROGRESS NOTES
SUBJECTIVE:   CC: Radha Navarro is an 38 year old woman who presents for preventive health visit.     Healthy Habits:    Do you get at least three servings of calcium containing foods daily (dairy, green leafy vegetables, etc.)? yes    Amount of exercise or daily activities, outside of work: None    Problems taking medications regularly No    Medication side effects: No    Have you had an eye exam in the past two years? Not sure    Do you see a dentist twice per year? no    Do you have sleep apnea, excessive snoring or daytime drowsiness?no      PROBLEMS TO ADD ON...  Patient informed that anything we discuss that is not related to preventative medicine, may be billed for; patient verbalizes understanding.      FYI-Aveeno active naturals Eczema therapy cream   Currently taking Nitrofurantoin for UTI  -------------------------------------    Today's PHQ-2 Score:   PHQ-2 (  [employer]) 4/10/2019 2016   Q1: Little interest or pleasure in doing things 0 1   Q2: Feeling down, depressed or hopeless 0 1   PHQ-2 Score 0 2   Q1: Little interest or pleasure in doing things - -   Q2: Feeling down, depressed or hopeless - -   PHQ-2 Score - -       Abuse: Current or Past(Physical, Sexual or Emotional)- No  Do you feel safe in your environment? Yes    Social History     Tobacco Use     Smoking status: Former Smoker     Packs/day: 0.00     Types: Cigarettes     Last attempt to quit: 2013     Years since quittin.6     Smokeless tobacco: Never Used   Substance Use Topics     Alcohol use: No     Comment: SOBER SINCE 9TH GRADE     If you drink alcohol do you typically have >3 drinks per day or >7 drinks per week? No                     Reviewed orders with patient.  Reviewed health maintenance and updated orders accordingly - Yes  Labs reviewed in EPIC    Mammogram not appropriate for this patient based on age.    Pertinent mammograms are reviewed under the imaging tab.  History of abnormal Pap smear: NO - age 30-  "65 PAP every 3 years recommended  PAP / HPV Latest Ref Rng & Units 9/11/2015 5/8/2012 8/16/2010   PAP - NIL NIL NIL   HPV 16 DNA NEG Negative - -   HPV 18 DNA NEG Negative - -   OTHER HR HPV NEG Negative - -     Reviewed and updated as needed this visit by clinical staff         Reviewed and updated as needed this visit by Provider        Past Medical History:   Diagnosis Date     Anxiety      Depression     Med free x 5 yrs (celexa, effexor)     Sciatica      Shingles     x 2- triggered by high stress        ROS:  Other than what is noted in the HPI and PMH a complete review of systems is otherwise negative including: Constitutional, HEENT, endocrine, cardiovascular, respiratory, GI/, musculoskeletal, neuro, and psychiatric.     OBJECTIVE:   /81   Pulse 80   Temp 96  F (35.6  C) (Tympanic)   Resp 18   Ht 1.655 m (5' 5.16\")   Wt 70.3 kg (155 lb)   SpO2 100%   BMI 25.67 kg/m    EXAM:  GENERAL: healthy, alert and no distress  EYES: Eyes grossly normal to inspection, PERRL and conjunctivae and sclerae normal  HENT: ear canals and TM's normal, nose and mouth without ulcers or lesions  NECK: no adenopathy, no asymmetry, masses, or scars and thyroid normal to palpation  RESP: lungs clear to auscultation - no rales, rhonchi or wheezes  BREAST: normal without masses, tenderness or nipple discharge and no palpable axillary masses or adenopathy  CV: regular rate and rhythm, normal S1 S2, no S3 or S4, no murmur  ABDOMEN: soft, nontender, no hepatosplenomegaly, no masses and bowel sounds normal   (female): normal female external genitalia, normal urethral meatus, vaginal mucosa pink, moist, well rugated, and normal cervix  MS: no gross musculoskeletal defects noted, no edema  SKIN: no suspicious lesions or rashes  NEURO: Normal strength and tone, mentation intact and speech normal  PSYCH: mentation appears normal, affect normal/bright    ASSESSMENT/PLAN:       ICD-10-CM    1. Encounter for routine adult health " "examination without abnormal findings Z00.00 Lipid panel reflex to direct LDL Fasting     Glucose     Pap imaged thin layer screen with HPV - recommended age 30 - 65 years (select HPV order below)     HPV High Risk Types DNA Cervical     OPTOMETRY REFERRAL   2. Screening for HIV (human immunodeficiency virus) Z11.4 HIV Screening   3. Situational anxiety F41.8 diazepam (VALIUM) 10 MG tablet       1,2) Screenings discussed    3) Will trial using Valium prior to her blood draw - hates needles. Otherwise, follow up annually      COUNSELING:   Reviewed preventive health counseling, as reflected in patient instructions    BP Readings from Last 1 Encounters:   04/10/19 123/81     Estimated body mass index is 25.67 kg/m  as calculated from the following:    Height as of this encounter: 1.655 m (5' 5.16\").    Weight as of this encounter: 70.3 kg (155 lb).     reports that she quit smoking about 5 years ago. Her smoking use included cigarettes. She smoked 0.00 packs per day. She has never used smokeless tobacco.      Counseling Resources:  ATP IV Guidelines  Pooled Cohorts Equation Calculator  Breast Cancer Risk Calculator  FRAX Risk Assessment  ICSI Preventive Guidelines  Dietary Guidelines for Americans, 2010  [employer] MyPlate  ASA Prophylaxis  Lung CA Screening    Dona Murillo PA-C  [facility]  "

## 2019-04-11 ASSESSMENT — ANXIETY QUESTIONNAIRES: GAD7 TOTAL SCORE: 0

## 2019-04-12 LAB
COPATH REPORT: NORMAL
PAP: NORMAL

## 2019-04-14 ENCOUNTER — MYC MEDICAL ADVICE (OUTPATIENT)
Dept: FAMILY MEDICINE | Facility: CLINIC | Age: 39
End: 2019-04-14

## 2019-04-15 NOTE — TELEPHONE ENCOUNTER
Patient returned call, she scheduled appointment for 4/18/19. She is not having kidney pain today. Will call to cancel appointment if she feels not needed.

## 2019-04-16 LAB
FINAL DIAGNOSIS: ABNORMAL
HPV HR 12 DNA CVX QL NAA+PROBE: POSITIVE
HPV16 DNA SPEC QL NAA+PROBE: POSITIVE
HPV18 DNA SPEC QL NAA+PROBE: NEGATIVE
SPECIMEN DESCRIPTION: ABNORMAL
SPECIMEN SOURCE CVX/VAG CYTO: ABNORMAL

## 2019-04-16 NOTE — PROGRESS NOTES
4/10/19 NIL Pap, + HR HPV 16 & other (Neg 18). Plan colp  4/18/19 Pt notified.   4/19/19 [place] [facility] - Negative. Plan cotest in 1 year.   4/24/19 Message left to return call. Result released to [employer]. Pt viewed result on [employer]  4/13/20 COVID 19 interim plan updated to: Plan unchanged.

## 2019-04-19 ENCOUNTER — OFFICE VISIT (OUTPATIENT)
Dept: OBGYN | Facility: CLINIC | Age: 39
End: 2019-04-19
Payer: COMMERCIAL

## 2019-04-19 VITALS
OXYGEN SATURATION: 99 % | DIASTOLIC BLOOD PRESSURE: 77 MMHG | WEIGHT: 155.4 LBS | HEART RATE: 86 BPM | BODY MASS INDEX: 25.74 KG/M2 | SYSTOLIC BLOOD PRESSURE: 112 MMHG

## 2019-04-19 DIAGNOSIS — R87.810 CERVICAL HIGH RISK HPV (HUMAN PAPILLOMAVIRUS) TEST POSITIVE: ICD-10-CM

## 2019-04-19 PROCEDURE — 57456 ENDOCERV CURETTAGE W/SCOPE: CPT | Performed by: OBSTETRICS & GYNECOLOGY

## 2019-04-19 PROCEDURE — 88305 TISSUE EXAM BY PATHOLOGIST: CPT | Performed by: OBSTETRICS & GYNECOLOGY

## 2019-04-19 PROCEDURE — 99214 OFFICE O/P EST MOD 30 MIN: CPT | Mod: 25 | Performed by: OBSTETRICS & GYNECOLOGY

## 2019-04-19 NOTE — PATIENT INSTRUCTIONS
If you have any questions regarding your visit, Please contact your care team.    [employer] [facility]: [phone]      [facility] CLINIC HOURS TELEPHONE NUMBER   Cephas Agbeh, M.D.    Jo-Ann Vasquez -         Monday-[facility]  8:00 am - 5 pm  Tuesday- [facility]  8:00am- 5 pm  Wednesday- Off  Thursday- [facility]  8:00 am- 5 pm  Friday-[place]  8:00 am 5 pm [facility]  [address]  [place], [place] [address] [phone] ask for [facility]    Imaging [phone]    [facility]  [address]  [place] [address] [phone]  Imaging [phone]     Urgent Care locations:    [facility] Saturday and Sunday   9 am - 5 pm    Monday-Friday   12 pm - 8 pm  Saturday and Sunday   9 am - 5 pm   [phone]       If you need a medication refill, please contact your pharmacy. Please allow 3 business days for your refill to be completed.  As always, Thank you for trusting us with your healthcare needs!

## 2019-04-19 NOTE — PROGRESS NOTES
Patient Name: Radha Navarro              Date: 4/19/2019   YOB: 1980                         Age: 38 year old   Phone: [phone] (home) [phone] (work)  ________________________________________________________________________  I have been asked to see Radha in consultation by JEANNIE WILKINSON  to discuss the pap smear, findings and possible further evaluation.  The patient's pap smear history is as noted:  Noted:  4/10/2019   Priority:  Medium   Overview Signed 4/16/2019  4:27 PM by Noris King RN   4/10/19 NIL Pap, + HR HPV 16 & other (Neg 18). Plan colp       I attempted to ensure that the patient was educated regarding the nature of her findings and implications to date.  We reviewed the role of HPV, incidence in the population and the natural history of the infection, and its transmission.  We also reviewed ways to minimize her future risk, the effect of HPV on the cervix and treatment options available, should they be indicated.    The pathophysiology of the cervix, including a discussion of the squamous and columnar cells, metaplasia and dysplasia have been reviewed, drawings, sketches and the pamphlets were reviewed with her.      Patient's last menstrual period was 03/31/2019 (approximate).  Current Birth Control Method: IUD KYLEENA    History of genital warts:  No  Visible warts now?:  No  Family History of  Cervical, Uterine or Vaginal Cancer?: No    Past Medical History:   Diagnosis Date     Anxiety      Cervical high risk HPV (human papillomavirus) test positive 04/10/2019    see problem list     Depression     Med free x 5 yrs (celexa, effexor)     IUD (intrauterine device) in place 03/19/2018     Sciatica      Shingles     x 2- triggered by high stress       Past Surgical History:   Procedure Laterality Date     HC EXCIS PRIMARY GANGLION WRIST      x2 on left wrist; x1 on right side (2011)     TONSILLECTOMY & ADENOIDECTOMY  Age 23        Outpatient Encounter Medications as of  2019   Medication Sig Dispense Refill     levonorgestrel (KYLEENA) 19.5 MG IUD 1 each by Intrauterine route once Placed 3/19/18       Pseudoeph-Doxylamine-DM-APAP (NYQUIL PO)        Pseudoephedrine-DM-GG-APAP (DAYQUIL LIQUICAPS OR)        diazepam (VALIUM) 10 MG tablet Take 0.5-1 tablets (5-10 mg) by mouth once as needed for anxiety - take 30-60 mins prior to procedure (Patient not taking: Reported on 2019) 5 tablet 0     mometasone (ELOCON) 0.1 % cream Apply sparingly to affected area twice daily x5-10 days as needed.  Do not apply to face. (Patient not taking: Reported on 2018) 45 g 1     [DISCONTINUED] nitroFURantoin macrocrystal-monohydrate (MACROBID) 100 MG capsule   0     [DISCONTINUED] ondansetron (ZOFRAN-ODT) 4 MG ODT tab Take 1-2 tablets (4-8 mg) by mouth every 8 hours as needed for nausea or vomiting 20 tablet 0     No facility-administered encounter medications on file as of 2019.         Allergies as of 2019 - Reviewed 2019   Allergen Reaction Noted     Oxycodone Itching 2012       Social History     Socioeconomic History     Marital status: Single     Spouse name: None     Number of children: None     Years of education: None     Highest education level: None   Occupational History     None   Social Needs     Financial resource strain: None     Food insecurity:     Worry: None     Inability: None     Transportation needs:     Medical: None     Non-medical: None   Tobacco Use     Smoking status: Former Smoker     Packs/day: 0.00     Types: Cigarettes     Last attempt to quit: 2013     Years since quittin.6     Smokeless tobacco: Never Used   Substance and Sexual Activity     Alcohol use: No     Comment: SOBER SINCE 9TH GRADE     Drug use: No     Comment: SOBER SINCE 9TH GRADE     Sexual activity: Yes     Partners: Male     Birth control/protection: IUD   Lifestyle     Physical activity:     Days per week: None     Minutes per session: None     Stress: None    Relationships     Social connections:     Talks on phone: None     Gets together: None     Attends Taoist service: None     Active member of club or organization: None     Attends meetings of clubs or organizations: None     Relationship status: None     Intimate partner violence:     Fear of current or ex partner: None     Emotionally abused: None     Physically abused: None     Forced sexual activity: None   Other Topics Concern     Parent/sibling w/ CABG, MI or angioplasty before 65F 55M? Not Asked   Social History Narrative     None        Family History   Problem Relation Age of Onset     Lipids Father      Diabetes Maternal Grandmother      Diabetes Maternal Grandfather      Diabetes Paternal Grandmother      Diabetes Paternal Grandfather      Cancer Mother         Cervical?     Breast Cancer Maternal Aunt      Cancer Other         cousin has cervical cancer/cousin has cervical cancer     Cancer Maternal Aunt         cervical cancer     Cancer Maternal Aunt         cervical cancer         Review Of Systems  10 point ROS of systems including Constitutional, Eyes, Respiratory, Cardiovascular, Gastroenterology, Genitourinary, Integumentary, Muscularskeletal, Psychiatric were all negative except for pertinent positives noted in my HPI and in the PMH.      Exam:   /77   Pulse 86   Wt 70.5 kg (155 lb 6.4 oz)   LMP 03/31/2019 (Approximate)   SpO2 99%   Breastfeeding? No   BMI 25.74 kg/m    GENERAL:  WNWD female NAD  HEENT: NC/AT, EOMI  Lungs:  Good respiratory effort   SKIN: normal skin turgor  GAIT: Normal  NECK: Symmetrical, no masses noted   VULVA: Normal Genitalia  BUS: Normal  URETHRA:  No hypermobility noted  URETHRAL MEATUS:  No masses noted  VAGINA: Normal mucosa, no discharge  CERVIX: Closed, mobile, no discharge  PERIANAL:  No masses or lesions seen  EXTREMITIES: no clubbing, cyanosis, or edema    Assessment:  NIL,HPV 16, other high risk hpv.    Plan:  Recommend to Proceed with Colpo  The  details of the colposcopic procedure were reviewed, the risks of missed diagnoses, pain, infection, and bleeding.          Procedure:  Procedure for colposcopy and biopsy has been explained to the patient and consent obtained.    Before the procedure, it was ensured that the patient was educated regarding the nature of her findings and implications to date.  We reviewed the role of HPV and the natural history of the infection.  We also reviewed ways to minimize her future risk, the effect of HPV on the cervix and treatment options available, should they be indicated.    The pathophysiology of the cervix, including a discussion of the squamous and columnar cells, metaplasia and dysplasia have been reviewed, drawings, sketches and the pamphlets were reviewed with her.  The details of the colposcopic procedure were reviewed, the risks of missed diagnoses, pain, infection, and bleeding.  Questions seemed to be answered before proceeding and the patient then consented to the procedure.     Speculum placed in vagina and excellent visualization of cervix achieved, cervix swabbed  with acetic acid solution.    ECC DONE.  Hemostasis effected with Silver Nitrate.     Findings:    Cervix: no visible lesions  Vaginal inspection: no visible lesions.  Procedure Summary: Patient tolerated procedure well and colposcopy adequate.      Assessment:     ICD-10-CM    1. Cervical high risk HPV (human papillomavirus) test positive R87.810 COLP CERVIX/UPPER VAGINA W BX CERVIX/ENDOCERV CURETT     Surgical pathology exam         Plan:  Specimens labelled and sent to pathology.  Will base further treatment on pathology findings.  Post biopsy instructions given to patient and call to discuss Pathology results.  TT 30 min, in addition to the time for the procedure  CT greater than 50%, as noted above in the HPI and in the Plan.     CEPHAS AGBEH, MD.

## 2019-04-23 LAB — COPATH REPORT: NORMAL

## 2020-01-03 ENCOUNTER — TELEPHONE (OUTPATIENT)
Dept: FAMILY MEDICINE | Facility: CLINIC | Age: 40
End: 2020-01-03

## 2020-01-03 NOTE — TELEPHONE ENCOUNTER
Called patient, no answer. Left message on voice mail for patient to call clinic. [phone]    Deisi Galindo, RN, BSN

## 2020-01-03 NOTE — TELEPHONE ENCOUNTER
Reason for call:  Order   Order or referral being requested: Lab order  Reason for request: to get complete blood work done in one day  Date needed: as soon as possible  Has the patient been seen by the PCP for this problem? YES    Additional comments: Patient called asking if she could get a complete blood work done, she states that her limps are painful - she feels she may have gout.  She would like a call when the order has been placed      Phone number to reach patient:  Cell number on file:    Telephone Information:   Mobile [phone]       Best Time:  anytime    Can we leave a detailed message on this number?  YES

## 2020-01-03 NOTE — TELEPHONE ENCOUNTER
"Per patient she started having foot/ankle pain that was burning in nature. Had previously been on an abx for UTI which patient said \"the doctor said it can cause ligament damage\". Then patient stating a few weeks later noting knee/ankle pain and shortly after that noting elbow/wrist/finger pain.    OTC tried: ibuprofen,  Tylenol and Aspirin with limited relief.    Pt wondering if she can have her labs (from last year and add any additional that may be diagnostic for current concern) done then F/U with Dona and combine it all for yearly exam?    Please advise.    Deisi Galindo, RN, BSN          "

## 2020-01-06 ENCOUNTER — TELEPHONE (OUTPATIENT)
Dept: FAMILY MEDICINE | Facility: CLINIC | Age: 40
End: 2020-01-06

## 2020-01-06 NOTE — TELEPHONE ENCOUNTER
Patient informed per Dona Murillo.  Patient did go to urgent care on Friday.  Patient is waiting for results.  Patient stated she will call and schedule once she has results.

## 2020-01-06 NOTE — TELEPHONE ENCOUNTER
Patient states she was tested for rheumatoid arthritis today and is wondering if she will have time to discuss the results with Dona at the time of her appointment. Please call    Thank you

## 2020-01-09 DIAGNOSIS — Z00.00 ENCOUNTER FOR ROUTINE ADULT HEALTH EXAMINATION WITHOUT ABNORMAL FINDINGS: ICD-10-CM

## 2020-01-09 DIAGNOSIS — Z11.4 SCREENING FOR HIV (HUMAN IMMUNODEFICIENCY VIRUS): ICD-10-CM

## 2020-01-09 LAB
CHOLEST SERPL-MCNC: 169 MG/DL
GLUCOSE SERPL-MCNC: 89 MG/DL (ref 70–99)
HDLC SERPL-MCNC: 46 MG/DL
LDLC SERPL CALC-MCNC: 109 MG/DL
NONHDLC SERPL-MCNC: 123 MG/DL
TRIGL SERPL-MCNC: 70 MG/DL

## 2020-01-09 PROCEDURE — 82947 ASSAY GLUCOSE BLOOD QUANT: CPT | Performed by: PHYSICIAN ASSISTANT

## 2020-01-09 PROCEDURE — 87389 HIV-1 AG W/HIV-1&-2 AB AG IA: CPT | Performed by: PHYSICIAN ASSISTANT

## 2020-01-09 PROCEDURE — 36415 COLL VENOUS BLD VENIPUNCTURE: CPT | Performed by: PHYSICIAN ASSISTANT

## 2020-01-09 PROCEDURE — 80061 LIPID PANEL: CPT | Performed by: PHYSICIAN ASSISTANT

## 2020-01-10 LAB — HIV 1+2 AB+HIV1 P24 AG SERPL QL IA: NONREACTIVE

## 2020-01-14 ENCOUNTER — OFFICE VISIT (OUTPATIENT)
Dept: FAMILY MEDICINE | Facility: CLINIC | Age: 40
End: 2020-01-14
Payer: COMMERCIAL

## 2020-01-14 VITALS
WEIGHT: 172 LBS | HEART RATE: 89 BPM | BODY MASS INDEX: 28.48 KG/M2 | TEMPERATURE: 98.4 F | OXYGEN SATURATION: 99 % | SYSTOLIC BLOOD PRESSURE: 126 MMHG | DIASTOLIC BLOOD PRESSURE: 82 MMHG | RESPIRATION RATE: 20 BRPM

## 2020-01-14 DIAGNOSIS — M06.4 INFLAMMATORY POLYARTHROPATHY (H): Primary | ICD-10-CM

## 2020-01-14 PROCEDURE — 99214 OFFICE O/P EST MOD 30 MIN: CPT | Performed by: PHYSICIAN ASSISTANT

## 2020-01-14 RX ORDER — NAPROXEN 500 MG/1
500 TABLET ORAL
COMMUNITY
Start: 2020-01-03 | End: 2021-09-28

## 2020-01-14 RX ORDER — PREDNISONE 10 MG/1
TABLET ORAL
Qty: 35 TABLET | Refills: 0 | Status: SHIPPED | OUTPATIENT
Start: 2020-01-14 | End: 2020-07-28

## 2020-01-14 NOTE — PROGRESS NOTES
Subjective     Radha Navarro is a 39 year old female who presents to clinic today for the following health issues:    HPI   ED/UC Followup:    Facility:  [facility]  Date of visit: 2020  Reason for visit: body aches  Current Status: worsening-swelling on hands, knees, and ankles    Swelling started end of Nov, early Dec  Joints are so painful, it hurts to move   Had many antibiotics for UTI's from 2019 through 2019  Denies autoimmune conditions in the family         Patient Active Problem List   Diagnosis     CARDIOVASCULAR SCREENING; LDL GOAL LESS THAN 160     HDL lipoprotein deficiency     Ganglion cyst of wrist, Left side, recurrent     Migraines     Cervical pain     Headache     Transient insomnia     Cervical high risk HPV (human papillomavirus) test positive     Inflammatory polyarthropathy (H)     Past Surgical History:   Procedure Laterality Date     HC EXCIS PRIMARY GANGLION WRIST      x2 on left wrist; x1 on right side ()     TONSILLECTOMY & ADENOIDECTOMY  Age 23       Social History     Tobacco Use     Smoking status: Former Smoker     Packs/day: 0.00     Types: Cigarettes     Last attempt to quit: 2013     Years since quittin.4     Smokeless tobacco: Never Used   Substance Use Topics     Alcohol use: No     Comment: SOBER SINCE 9TH GRADE     Family History   Problem Relation Age of Onset     Lipids Father      Diabetes Maternal Grandmother      Diabetes Maternal Grandfather      Diabetes Paternal Grandmother      Diabetes Paternal Grandfather      Cancer Mother         Cervical?     Breast Cancer Maternal Aunt      Cancer Other         cousin has cervical cancer/cousin has cervical cancer     Cancer Maternal Aunt         cervical cancer     Cancer Maternal Aunt         cervical cancer           Reviewed and updated as needed this visit by Provider         Review of Systems   ROS COMP: Constitutional, musculoskeletal, neuro, skin  "systems are negative, except as otherwise noted.      Objective    /82   Pulse 89   Temp 98.4  F (36.9  C) (Tympanic)   Resp 20   Wt 78 kg (172 lb)   SpO2 99%   BMI 28.48 kg/m    Body mass index is 28.48 kg/m .  Physical Exam   GENERAL: healthy, alert and no distress  MS: moderate edema of the hands, wrists, lower extremities (ankles, feet)  SKIN: no suspicious lesions or rashes  NEURO: Normal strength and tone, mentation intact and speech normal  PSYCH: mentation appears normal, affect normal/bright          Assessment & Plan   Assessment  1. Inflammatory polyarthropathy (H)         Plan  Care Everywhere reviewed: normal labs including neg RF, GORDY, and Lyme studies. Will trial a B&T of prednisone while she waits to see rheumatology - referral placed today. Unsure of the exact cause of her symptoms, but it appears to be inflammatory arthritis.      BMI:   Estimated body mass index is 28.48 kg/m  as calculated from the following:    Height as of 4/10/19: 1.655 m (5' 5.16\").    Weight as of this encounter: 78 kg (172 lb).       Return for follow up with rheumatology.    Dona Murillo PA-C  [facility]          "

## 2020-01-18 ENCOUNTER — MYC MEDICAL ADVICE (OUTPATIENT)
Dept: FAMILY MEDICINE | Facility: CLINIC | Age: 40
End: 2020-01-18

## 2020-01-18 DIAGNOSIS — M06.4 INFLAMMATORY POLYARTHROPATHY (H): Primary | ICD-10-CM

## 2020-02-03 RX ORDER — PREDNISONE 5 MG/1
TABLET ORAL
Qty: 119 TABLET | Refills: 0 | Status: SHIPPED | OUTPATIENT
Start: 2020-02-03 | End: 2020-03-18

## 2020-02-03 NOTE — TELEPHONE ENCOUNTER
Rheumatology team: please call MsAna Ramon to see if she would like an earlier rheumatology appointment in mid-March (use two open 20 min slots on March 18, 19, or 20th).    Raymundo Méndez MD  2/3/2020 5:25 PM

## 2020-02-04 NOTE — TELEPHONE ENCOUNTER
Called and spoke with patient, made appointment for March 18th at 7:20 AM.  Janeth Santos CMA Rheumatology  2/4/2020 8:25 AM

## 2020-02-24 ENCOUNTER — HEALTH MAINTENANCE LETTER (OUTPATIENT)
Age: 40
End: 2020-02-24

## 2020-03-17 ENCOUNTER — TELEPHONE (OUTPATIENT)
Dept: RHEUMATOLOGY | Facility: CLINIC | Age: 40
End: 2020-03-17

## 2020-03-17 NOTE — TELEPHONE ENCOUNTER
Left message for patient to call back to Rheumatology.    As of now we are deferring all new patients for 90 days.     Janeth Santos CMA Rheumatology  3/17/2020 2:37 PM

## 2020-03-18 ENCOUNTER — OFFICE VISIT (OUTPATIENT)
Dept: RHEUMATOLOGY | Facility: CLINIC | Age: 40
End: 2020-03-18
Payer: COMMERCIAL

## 2020-03-18 VITALS
WEIGHT: 171.4 LBS | BODY MASS INDEX: 28.56 KG/M2 | HEIGHT: 65 IN | SYSTOLIC BLOOD PRESSURE: 118 MMHG | DIASTOLIC BLOOD PRESSURE: 76 MMHG

## 2020-03-18 DIAGNOSIS — Z79.899 HIGH RISK MEDICATIONS (NOT ANTICOAGULANTS) LONG-TERM USE: ICD-10-CM

## 2020-03-18 DIAGNOSIS — M06.4 INFLAMMATORY POLYARTHROPATHY (H): Primary | ICD-10-CM

## 2020-03-18 PROCEDURE — 99244 OFF/OP CNSLTJ NEW/EST MOD 40: CPT | Performed by: INTERNAL MEDICINE

## 2020-03-18 RX ORDER — PREDNISONE 2.5 MG/1
TABLET ORAL
Qty: 42 TABLET | Refills: 0 | Status: SHIPPED | OUTPATIENT
Start: 2020-03-18 | End: 2020-07-28

## 2020-03-18 ASSESSMENT — MIFFLIN-ST. JEOR: SCORE: 1455.89

## 2020-03-18 NOTE — PROGRESS NOTES
Rheumatology Clinic Visit      Radha Navarro MRN# [id number]   YOB: 1980 Age: 39 year old      Date of visit: 3/18/20   Referring provider: Dona Murillo  PCP: Dona Murillo    Chief Complaint   Patient presents with:  Consult: Ankles, knees, elbows, wrists and fingers hurt. Prednsione 10mg until Tuesday 3/24 then tapers down to 5 mg      Assessment and Plan     1.  Inflammatory polyarthropathy: polyarticular joint swelling and pain involving her MCPs, PIPs, wrist, elbows, knees, and ankles that started in November 2019.  Steroid responsive and currently on prednisone 10 mg daily.  No synovitis on exam today.  Suspect rheumatoid arthritis given the symmetric joint involvement including small and large joints.  Discussed treatment option with methotrexate or hydroxychloroquine.  She prefers to taper off prednisone completely at this time before considering additional medication, despite knowing that her symptoms may worsen.  She says that she is hesitant to use any medications requiring injections, infusions, or phlebotomy for toxicity monitoring.  We reviewed her disease, risk for not treating, and risks of treatment. She refused further workup at this time.  Re-eval in 3-4 weeks.     # Methotrexate Risks and Benefits: The risks and benefits of methotrexate were discussed in detail and the patient verbalized understanding.  The risks discussed include, but are not limited to, the risk for hypersensitivity, anaphylaxis, anaphylactoid reactions, infections, bone marrow suppression, renal toxicity, hepatotoxicity, pulmonary toxicity, malignancy, impaired fertility, GI upset, alopecia, and oral and nasal sores.  Folic acid supplementation is recommended during methotrexate therapy to help prevent some of the side effects. Pregnancy prevention and planning was discussed; it is recommended that women of childbearing potential use reliable contraception during therapy.  The risks of taking both  methotrexate and alcohol were reviewed; complete alcohol avoidance was discussed.  Routine laboratory monitoring is required during methotrexate therapy. Taking MTX once weekly, all within a 24 hour period was stressed and the patient verbalized this instruction back to me.  I encouraged reviewing the package insert and asking any questions about the medication.    # Hydroxychloroquine (Plaquenil) Risks and Benefits:  The risks and benefits of hydroxychloroquine were discussed in detail and the patient verbalized understanding; the patient also verbalized agreement to get the required ophthalmologic toxicity monitoring.  The risks discussed include, but are not limited to, the risk for hypersensitivity, anaphylaxis, anaphylactoid reactions, irreversible retinal damage, rare hematologic reactions, and rare cardiomyopathy.  Patients with G6PD deficiency or hepatic impairment may be at an increased risk for adverse effects.  I encouraged reviewing the package insert and asking any questions about the medication.       Ms. Navarro verbalized agreement with and understanding of the rational for the diagnosis and treatment plan.  All questions were answered to best of my ability and the patient's satisfaction. Ms. Navarro was advised to contact the clinic with any questions that may arise after the clinic visit.      Thank you for involving me in the care of the patient    Return to clinic: 4 weeks    HPI   Radha Navarro is a 39 year old female  who is seen in consultation at the request of Dona Murillo for evaluation of joint pian.    Today, Ms. Navarro reports that starting in late Nov 2019 she had pain and swelling in the MCPs, IPs, wrists, elbows, knees, and ankles were painful.  Couldn't make a fist. Morning stiffness all day.  Started prednisone 20mg daily with significant relief; tapered but then when reduced from 15mg daily to 10mg daily she had significant worsening of symptoms so Dona Murillo reached out to me  and I advised slower prednisone dose reduction.  Now on prednisone 10mg daily (tapered from 15mg daily to 12.5mg daily to 10mg daily).  Within the last 1 week starting to feel even better. Naproxen used previously without improvement. GI upset with ibuprofen.     She notes having used cipro for a UTI in Oct 2019 with subsequent bilateral superior knee pain that then progressed to superior and inferior patellar pain, then anterior knee pain.     Denies fevers, chills, nausea, vomiting, constipation, diarrhea. No abdominal pain. No chest pain/pressure, palpitations, or shortness of breath. No LE swelling. No neck pain. No oral or nasal sores.  No rash. No sicca symptoms. No photosensitivity or photophobia. No eye pain or redness. No history of inflammatory eye disease.  No history of DVT, pulmonary embolism, or miscarriage; no conception hx.   No history of serositis.  No history of Raynaud's Phenomenon.  No seizure history.  No known renal disorder.      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 No oral or nasal ulcers.  CV: No chest pain, pressure, palpitations, or dyspnea on exertion.  PULM: No SOB, wheeze, cough.  GI: No nausea, vomiting, constipation, diarrhea. No blood in stool. No abdominal pain.  : No blood in urine.  MSK: See HPI.  NEURO: No numbness, tingling, or weakness.  ENDO: No heat/cold intolerance.  EXT: No LE swelling  PSYCH: Negative    Active Problem List     Patient Active Problem List   Diagnosis     CARDIOVASCULAR SCREENING; LDL GOAL LESS THAN 160     HDL lipoprotein deficiency     Ganglion cyst of wrist, Left side, recurrent     Migraines     Cervical pain     Headache     Transient insomnia     Cervical high risk HPV (human papillomavirus) test positive     Inflammatory polyarthropathy (H)     Past Medical History     Past  Medical History:   Diagnosis Date     Anxiety      Cervical high risk HPV (human papillomavirus) test positive 04/10/2019    see problem list     Depression     Med free x 5 yrs (celexa, effexor)     IUD (intrauterine device) in place 03/19/2018     Sciatica      Shingles     x 2- triggered by high stress     Past Surgical History     Past Surgical History:   Procedure Laterality Date     HC EXCIS PRIMARY GANGLION WRIST      x2 on left wrist; x1 on right side (2011)     TONSILLECTOMY & ADENOIDECTOMY  Age 23     Allergy     Allergies   Allergen Reactions     Oxycodone Itching     Current Medication List     Current Outpatient Medications   Medication Sig     levonorgestrel (KYLEENA) 19.5 MG IUD 1 each by Intrauterine route once Placed 3/19/18     naproxen (NAPROSYN) 500 MG tablet Take 500 mg by mouth     predniSONE (DELTASONE) 5 MG tablet Take 3 tablets (15 mg) by mouth daily for 14 days, THEN 2.5 tablets (12.5 mg) daily for 14 days, THEN 2 tablets (10 mg) daily for 21 days.     diazepam (VALIUM) 10 MG tablet Take 0.5-1 tablets (5-10 mg) by mouth once as needed for anxiety - take 30-60 mins prior to procedure (Patient not taking: Reported on 4/19/2019)     mometasone (ELOCON) 0.1 % cream Apply sparingly to affected area twice daily x5-10 days as needed.  Do not apply to face. (Patient not taking: Reported on 6/28/2018)     Pseudoeph-Doxylamine-DM-APAP (NYQUIL PO)      Pseudoephedrine-DM-GG-APAP (DAYQUIL LIQUICAPS OR)      No current facility-administered medications for this visit.      Social History   See HPI    Family History     Family History   Problem Relation Age of Onset     Lipids Father      Diabetes Maternal Grandmother      Diabetes Maternal Grandfather      Diabetes Paternal Grandmother      Diabetes Paternal Grandfather      Cancer Mother         Cervical?     Breast Cancer Maternal Aunt      Cancer Other         cousin has cervical cancer/cousin has cervical cancer     Cancer Maternal Aunt          "cervical cancer     Cancer Maternal Aunt         cervical cancer     Physical Exam     Temp Readings from Last 3 Encounters:   01/14/20 98.4  F (36.9  C) (Tympanic)   04/10/19 96  F (35.6  C) (Tympanic)   06/28/18 99.1  F (37.3  C) (Tympanic)     BP Readings from Last 5 Encounters:   03/18/20 118/76   01/14/20 126/82   04/19/19 112/77   04/10/19 123/81   06/28/18 118/76     Pulse Readings from Last 1 Encounters:   01/14/20 89     Resp Readings from Last 1 Encounters:   01/14/20 20     Estimated body mass index is 28.38 kg/m  as calculated from the following:    Height as of this encounter: 1.655 m (5' 5.16\").    Weight as of this encounter: 77.7 kg (171 lb 6.4 oz).    GEN: NAD  HEENT: MMM. No oral lesions. Anicteric, noninjected sclera  CV: S1, S2. RRR. No m/r/g.  PULM: CTA bilaterally. No w/c.  ABD: +BS.   MSK: MCPs, PIPs, DIPs, wrists, elbows, shoulder, knees, ankles, and MTPs without swelling or tender to palpation. Hips nontender to palpation. Soft tissue redundancy at the lateral/medial ankles. No dactylitis.   NEURO: UE and LE strengths 5/5 and equal bilaterally.   SKIN: No rash. No nail pitting.   EXT: No LE edema  PSYCH: Alert. Appropriate.    Labs / Imaging (select studies)     CMP  Recent Labs   Lab Test 01/09/20  0844   GLC 89     Lipid Panel  Recent Labs   Lab Test 01/09/20  0844 05/17/12  0925   CHOL 169 176   TRIG 70 57   HDL 46* 43*   * 122   VLDL  --  11   CHOLHDLRATIO  --  4.1   NHDL 123  --      HIV Screening  Recent Labs   Lab Test 01/09/20  0844   HIAGAB Nonreactive     Immunization History     Immunization History   Administered Date(s) Administered     TD (ADULT, 7+) 01/01/2002     TDAP Vaccine (Adacel) 06/17/2011          Chart documentation done in part with Dragon Voice recognition Software. Although reviewed after completion, some word and grammatical error may remain.    Raymundo Méndez MD      "

## 2020-03-18 NOTE — NURSING NOTE
RAPID3 (0-30) Cumulative Score  2.7          RAPID3 Weighted Score (divide #4 by 3 and that is the weighted score)  0.9       Janeth Santos [facility] Rheumatology  3/18/2020 7:23 AM

## 2020-03-18 NOTE — PATIENT INSTRUCTIONS
Rheumatology      [facility] in [place]   (Monday)  [address]  [place] [address]       [facility] in [place]   (Tuesday)  [address] [place] [address]    [facility] in [place]   (Wed., Thurs., and Friday)  [address] [place] [address]    Phone number: [phone]  Thank you for choosing [facility]  Janeth Santos CMA

## 2020-04-13 PROBLEM — R87.810 CERVICAL HIGH RISK HPV (HUMAN PAPILLOMAVIRUS) TEST POSITIVE: Status: ACTIVE | Noted: 2019-04-10

## 2020-04-16 ENCOUNTER — VIRTUAL VISIT (OUTPATIENT)
Dept: RHEUMATOLOGY | Facility: CLINIC | Age: 40
End: 2020-04-16
Payer: COMMERCIAL

## 2020-04-16 DIAGNOSIS — M19.90 INFLAMMATORY ARTHRITIS: Primary | ICD-10-CM

## 2020-04-16 PROCEDURE — 99213 OFFICE O/P EST LOW 20 MIN: CPT | Mod: 95 | Performed by: INTERNAL MEDICINE

## 2020-04-16 NOTE — PROGRESS NOTES
"Radha Navarro is a 39 year old female who is being evaluated via a billable telephone visit.      The patient has been notified of following:     \"This telephone visit will be conducted via a call between you and your physician/provider. We have found that certain health care needs can be provided without the need for a physical exam.  This service lets us provide the care you need with a short phone conversation.  If a prescription is necessary we can send it directly to your pharmacy.  If lab work is needed we can place an order for that and you can then stop by our lab to have the test done at a later time.    Telephone visits are billed at different rates depending on your insurance coverage. During this emergency period, for some insurers they may be billed the same as an in-person visit.  Please reach out to your insurance provider with any questions.    If during the course of the call the physician/provider feels a telephone visit is not appropriate, you will not be charged for this service.\"    Patient has given verbal consent for Telephone visit?  Yes, [phone]    How would you like to obtain your AVS? MyChart    Additional provider notes:     Rheumatology Telephone / TeleHealth Visit      Radha Navarro MRN# [id number]   YOB: 1980 Age: 39 year old      Date of visit: 4/16/20   PCP: Dona Murillo    Chief Complaint   Patient presents with:  Arthritis    Assessment and Plan     1.  Inflammatory polyarthropathy: polyarticular joint swelling and pain involving her MCPs, PIPs, wrist, elbows, knees, and ankles that started in November 2019.  Steroid responsive.  Suspected rheumatoid arthritis given the symmetric joint involvement including small and large joints.  Discussed treatment option previously and again today: MTX, leflunomide, Xeljanz, Humira, HCQ.  May have supply issue with HCQ given its use for the current pandemic. She is hesitant to start tx because of needing to get labs.  " We reviewed the risks of the medications and she is going to do her own research and then we will check in next week on what / if she would like to start tx.  Reviewed the risks of treatments and the risks of untreated disease.     # Methotrexate Risks and Benefits: The risks and benefits of methotrexate were discussed in detail and the patient verbalized understanding.  The risks discussed include, but are not limited to, the risk for hypersensitivity, anaphylaxis, anaphylactoid reactions, infections, bone marrow suppression, renal toxicity, hepatotoxicity, pulmonary toxicity, malignancy, impaired fertility, GI upset, alopecia, and oral and nasal sores.  Folic acid supplementation is recommended during methotrexate therapy to help prevent some of the side effects. Pregnancy prevention and planning was discussed; it is recommended that women of childbearing potential use reliable contraception during therapy.  The risks of taking both methotrexate and alcohol were reviewed; complete alcohol avoidance was discussed.  Routine laboratory monitoring is required during methotrexate therapy. Taking MTX once weekly, all within a 24 hour period was stressed and the patient verbalized this instruction back to me.  I encouraged reviewing the package insert and asking any questions about the medication.    # Adalimumab (Humira) Risks and Benefits: The risks and benefits of adalimumab were discussed in detail and the patient verbalized understanding.  The risks discussed include, but are not limited to, the risk for hypersensitivity, anaphylaxis, anaphylactoid reactions, an increased risk for serious infections leading to hospitalization or death, a possible increased risk for lymphoma and other malignancies, a possible worsening of demyelinating diseases, a possible worsening of heart failure, risk for cytopenias, risk for drug induced lupus, possible reactivation of hepatitis B, and possible reactivation of latent  tuberculosis.  Subcutaneous injections may result in injection site reactions and/or pain at the site of injection.  The most common adverse reactions are infections, injection site reactions, headache, and rash.  It was discussed that the medication would need to be discontinued if a serious infection develops.  It was discussed that live vaccinations should not be received while using adalimumab or within 30 days prior to starting adalimumab.  I encouraged reviewing the package insert and asking any questions about the medication.      # Hydroxychloroquine (Plaquenil) Risks and Benefits:  The risks and benefits of hydroxychloroquine were discussed in detail and the patient verbalized understanding; the patient also verbalized agreement to get the required ophthalmologic toxicity monitoring.  The risks discussed include, but are not limited to, the risk for hypersensitivity, anaphylaxis, anaphylactoid reactions, irreversible retinal damage, rare hematologic reactions, and rare cardiomyopathy.  Patients with G6PD deficiency or hepatic impairment may be at an increased risk for adverse effects.  I encouraged reviewing the package insert and asking any questions about the medication.       2. Anger associated with blood draws: she reports that she becomes very angry when getting blood draws and this is the reason that she wants to avoid them.  Discussed that she may benefit from a mental health referral, possibly for cognitive behavioral therapy.  She says that she will consider this.      # Methotrexate Risks and Benefits: The risks and benefits of methotrexate were discussed in detail and the patient verbalized understanding.  The risks discussed include, but are not limited to, the risk for hypersensitivity, anaphylaxis, anaphylactoid reactions, infections, bone marrow suppression, renal toxicity, hepatotoxicity, pulmonary toxicity, malignancy, impaired fertility, GI upset, alopecia, and oral and nasal sores.   Folic acid supplementation is recommended during methotrexate therapy to help prevent some of the side effects. Pregnancy prevention and planning was discussed; it is recommended that women of childbearing potential use reliable contraception during therapy.  The risks of taking both methotrexate and alcohol were reviewed; complete alcohol avoidance was discussed.  Routine laboratory monitoring is required during methotrexate therapy. Taking MTX once weekly, all within a 24 hour period was stressed and the patient verbalized this instruction back to me.  I encouraged reviewing the package insert and asking any questions about the medication.    # Hydroxychloroquine (Plaquenil) Risks and Benefits:  The risks and benefits of hydroxychloroquine were discussed in detail and the patient verbalized understanding; the patient also verbalized agreement to get the required ophthalmologic toxicity monitoring.  The risks discussed include, but are not limited to, the risk for hypersensitivity, anaphylaxis, anaphylactoid reactions, irreversible retinal damage, rare hematologic reactions, and rare cardiomyopathy.  Patients with G6PD deficiency or hepatic impairment may be at an increased risk for adverse effects.  I encouraged reviewing the package insert and asking any questions about the medication.       Ms. Navarro verbalized agreement with and understanding of the rational for the diagnosis and treatment plan.  All questions were answered to best of my ability and the patient's satisfaction. Ms. Navarro was advised to contact the clinic with any questions that may arise after the clinic visit.      More than 50% of the time was spent counseling the patient.  Total time was at least 15 minutes.     Thank you for involving me in the care of the patient    Return to clinic: 1 week    HPI   Radha Navarro is a 39 year old female  who is seen for follow-up of arthritis.    Previously:  Ms. Navarro reported that starting in late  Nov 2019 she had pain and swelling in the MCPs, IPs, wrists, elbows, knees, and ankles were painful.  Couldn't make a fist. Morning stiffness all day.  Started prednisone 20mg daily with significant relief; tapered but then when reduced from 15mg daily to 10mg daily she had significant worsening of symptoms so Dona Murillo reached out to me and I advised slower prednisone dose reduction.  Now on prednisone 10mg daily (tapered from 15mg daily to 12.5mg daily to 10mg daily).  Within the last 1 week starting to feel even better. Naproxen used previously without improvement. GI upset with ibuprofen.      Today, she reports that she is considering treatment for her arthritis.  Concerned about blood draws because they cause her to be so angry.  Has many questions about the treatments.    Denies fevers, chills, nausea, vomiting, constipation, diarrhea. No abdominal pain. No chest pain/pressure, palpitations, or shortness of breath. No LE swelling. No neck pain. No oral or nasal sores.  No rash. No sicca symptoms.    Tobacco: none  EtOH: none  Drugs: none  Occupation: housekeeping management at [employer]   GEN: No fevers, chills, night sweats, or weight change  SKIN: No itching, rashes, sores  HEENT: No oral or nasal ulcers.  CV: No chest pain, pressure, palpitations, or dyspnea on exertion.  PULM: No SOB, wheeze, cough.  GI: No nausea, vomiting, constipation, diarrhea. No blood in stool.   : No blood in urine.  MSK: See HPI.  NEURO: No numbness, tingling, or weakness.  ENDO: No heat/cold intolerance.  EXT: No LE swelling  PSYCH: Negative    Active Problem List     Patient Active Problem List   Diagnosis     CARDIOVASCULAR SCREENING; LDL GOAL LESS THAN 160     HDL lipoprotein deficiency     Ganglion cyst of wrist, Left side, recurrent     Migraines     Cervical pain     Headache     Transient insomnia     Cervical high risk HPV (human papillomavirus) test positive     Inflammatory polyarthropathy (H)     Past  Medical History     Past Medical History:   Diagnosis Date     Anxiety      Cervical high risk HPV (human papillomavirus) test positive 04/10/2019    see problem list     Depression     Med free x 5 yrs (celexa, effexor)     IUD (intrauterine device) in place 03/19/2018     Sciatica      Shingles     x 2- triggered by high stress     Past Surgical History     Past Surgical History:   Procedure Laterality Date     HC EXCIS PRIMARY GANGLION WRIST      x2 on left wrist; x1 on right side (2011)     TONSILLECTOMY & ADENOIDECTOMY  Age 23     Allergy     Allergies   Allergen Reactions     Oxycodone Itching     Current Medication List     Current Outpatient Medications   Medication Sig     diazepam (VALIUM) 10 MG tablet Take 0.5-1 tablets (5-10 mg) by mouth once as needed for anxiety - take 30-60 mins prior to procedure     levonorgestrel (KYLEENA) 19.5 MG IUD 1 each by Intrauterine route once Placed 3/19/18     mometasone (ELOCON) 0.1 % cream Apply sparingly to affected area twice daily x5-10 days as needed.  Do not apply to face.     naproxen (NAPROSYN) 500 MG tablet Take 500 mg by mouth     Pseudoeph-Doxylamine-DM-APAP (NYQUIL PO)      Pseudoephedrine-DM-GG-APAP (DAYQUIL LIQUICAPS OR)      No current facility-administered medications for this visit.      Social History   See HPI    Family History     Family History   Problem Relation Age of Onset     Lipids Father      Diabetes Maternal Grandmother      Diabetes Maternal Grandfather      Diabetes Paternal Grandmother      Diabetes Paternal Grandfather      Cancer Mother         Cervical?     Breast Cancer Maternal Aunt      Cancer Other         cousin has cervical cancer/cousin has cervical cancer     Cancer Maternal Aunt         cervical cancer     Cancer Maternal Aunt         cervical cancer     Physical Exam     Telephone visit    Labs / Imaging (select studies)     CMP  Recent Labs   Lab Test 01/09/20  0844   GLC 89     Lipid Panel  Recent Labs   Lab Test  01/09/20  0844 05/17/12  0925   CHOL 169 176   TRIG 70 57   HDL 46* 43*   * 122   VLDL  --  11   CHOLHDLRATIO  --  4.1   NHDL 123  --      HIV Screening  Recent Labs   Lab Test 01/09/20  0844   HIAGAB Nonreactive       Immunization History     Immunization History   Administered Date(s) Administered     TD (ADULT, 7+) 01/01/2002     TDAP Vaccine (Adacel) 06/17/2011          Chart documentation done in part with Dragon Voice recognition Software. Although reviewed after completion, some word and grammatical error may remain.    Phone call start time: 8:28 AM  Phone call end time: 8:52 AM    This visit is equivalent to a 30770 visit    More than 50% of the time was spent counseling the patient.  Total time was at least 15 minutes.     Location of patient: home  Location of provider: home    Follow up:  follow up appointment scheduled to be in April, in 1 week from today    Raymundo Méndez MD  4/16/2020

## 2020-04-22 NOTE — PROGRESS NOTES
"Radha Navarro is a 39 year old female who is being evaluated via a billable telephone visit.      The patient has been notified of following:     \"This telephone visit will be conducted via a call between you and your physician/provider. We have found that certain health care needs can be provided without the need for a physical exam.  This service lets us provide the care you need with a short phone conversation.  If a prescription is necessary we can send it directly to your pharmacy.  If lab work is needed we can place an order for that and you can then stop by our lab to have the test done at a later time.    Telephone visits are billed at different rates depending on your insurance coverage. During this emergency period, for some insurers they may be billed the same as an in-person visit.  Please reach out to your insurance provider with any questions.    If during the course of the call the physician/provider feels a telephone visit is not appropriate, you will not be charged for this service.\"    Patient has given verbal consent for Telephone visit?  Yes    How would you like to obtain your AVS? [facility]      Rheumatology Telephone / TeleHealth Visit      Radha Navarro MRN# [id number]   YOB: 1980 Age: 39 year old      Date of visit: 4/23/20   PCP: Dona Murillo    Chief Complaint   Patient presents with:  RECHECK: inflammatory arthritis    Assessment and Plan     1.  Inflammatory polyarthropathy: polyarticular joint swelling and pain involving her MCPs, PIPs, wrist, elbows, knees, and ankles that started in November 2019.  Steroid responsive.  Suspected rheumatoid arthritis given the symmetric joint involvement including small and large joints.  Discussed treatment option previously and again today: MTX, leflunomide, Xeljanz, Humira, HCQ.  May have supply issue with HCQ given its use for the current pandemic. She is hesitant to start tx because of needing to get labs.  We reviewed the risks " of the medications again.  She says that she plans to just monitor her symptoms for now in hopes to take medication in the future when she is more comfortable with blood draws.  See #2 below.     2. Anger associated with blood draws: she reports that she becomes very angry when getting blood draws and this is the reason that she wants to avoid them.  Discussed that she may benefit from a mental health referral, possibly for cognitive behavioral therapy, and she says that she will explore this option at her place of employment where mental health is their business, and if she finds that she needs a referral for this she will reach out.  If so I would give a mental health referral for psychology regarding anger associated with blood draws that is preventing her from starting treatment for her inflammatory arthritis.     Ms. Navarro verbalized agreement with and understanding of the rational for the diagnosis and treatment plan.  All questions were answered to best of my ability and the patient's satisfaction. Ms. Navarro was advised to contact the clinic with any questions that may arise after the clinic visit.      Thank you for involving me in the care of the patient    Return to clinic: 3 months, sooner if she works out the issue in #2 and is ready to start tx for issue in #1    HPI   Radha Navarro is a 39 year old female  who is seen for follow-up of arthritis.    Previously:  Ms. Navarro reported that starting in late Nov 2019 she had pain and swelling in the MCPs, IPs, wrists, elbows, knees, and ankles were painful.  Couldn't make a fist. Morning stiffness all day.  Started prednisone 20mg daily with significant relief; tapered but then when reduced from 15mg daily to 10mg daily she had significant worsening of symptoms so Dona Murillo reached out to me and I advised slower prednisone dose reduction.  Now on prednisone 10mg daily (tapered from 15mg daily to 12.5mg daily to 10mg daily).  Within the last 1 week  starting to feel even better. Naproxen used previously without improvement. GI upset with ibuprofen.      4/16/2020: she reported that she is considering treatment for her arthritis.  Concerned about blood draws because they cause her to be so angry.  Has many questions about the treatments.    Today, 4/23/2020: thought about tx a lot and finally has decided that's she will first work on her anger associated with blood draws because otherwise she will not be able to get the blood draws; she says that even if she said that she would do blood draws now she knows that she wouldn't.  She does not want a mental health referral at this time but instead would like to see if she can address this at her place of employment and if not then will ask me for a referral.     Denies fevers, chills, nausea, vomiting, constipation, diarrhea. No abdominal pain. No chest pain/pressure, palpitations, or shortness of breath. No LE swelling. No neck pain. No oral or nasal sores.  No rash. No sicca symptoms.    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   SKIN: No itching, rashes, sores  HEENT: No oral or nasal ulcers.  CV: No chest pain, pressure, palpitations, or dyspnea on exertion.  PULM: No SOB, wheeze, cough.  GI: No nausea, vomiting, constipation, diarrhea. No blood in stool.   : No blood in urine.  MSK: See HPI.  NEURO: No numbness, tingling, or weakness.  EXT: No LE swelling  PSYCH: Negative    Active Problem List     Patient Active Problem List   Diagnosis     CARDIOVASCULAR SCREENING; LDL GOAL LESS THAN 160     HDL lipoprotein deficiency     Ganglion cyst of wrist, Left side, recurrent     Migraines     Cervical pain     Headache     Transient insomnia     Cervical high risk HPV (human papillomavirus) test positive     Inflammatory polyarthropathy (H)     Past Medical History     Past Medical History:   Diagnosis Date     Anxiety      Cervical  high risk HPV (human papillomavirus) test positive 04/10/2019    see problem list     Depression     Med free x 5 yrs (celexa, effexor)     IUD (intrauterine device) in place 03/19/2018     Sciatica      Shingles     x 2- triggered by high stress     Past Surgical History     Past Surgical History:   Procedure Laterality Date     HC EXCIS PRIMARY GANGLION WRIST      x2 on left wrist; x1 on right side (2011)     TONSILLECTOMY & ADENOIDECTOMY  Age 23     Allergy     Allergies   Allergen Reactions     Ciprofloxacin      Oxycodone Itching     Current Medication List     Current Outpatient Medications   Medication Sig     diazepam (VALIUM) 10 MG tablet Take 0.5-1 tablets (5-10 mg) by mouth once as needed for anxiety - take 30-60 mins prior to procedure     levonorgestrel (KYLEENA) 19.5 MG IUD 1 each by Intrauterine route once Placed 3/19/18     mometasone (ELOCON) 0.1 % cream Apply sparingly to affected area twice daily x5-10 days as needed.  Do not apply to face.     naproxen (NAPROSYN) 500 MG tablet Take 500 mg by mouth     Pseudoeph-Doxylamine-DM-APAP (NYQUIL PO)      Pseudoephedrine-DM-GG-APAP (DAYQUIL LIQUICAPS OR)      No current facility-administered medications for this visit.      Social History   See HPI    Family History     Family History   Problem Relation Age of Onset     Lipids Father      Diabetes Maternal Grandmother      Diabetes Maternal Grandfather      Diabetes Paternal Grandmother      Diabetes Paternal Grandfather      Cancer Mother         Cervical?     Crohn's Disease Mother      Breast Cancer Maternal Aunt      Cancer Other         cousin has cervical cancer/cousin has cervical cancer     Cancer Maternal Aunt         cervical cancer     Cancer Maternal Aunt         cervical cancer     Physical Exam     Telephone visit    Labs / Imaging (select studies)     CMP  Recent Labs   Lab Test 01/09/20  0844   GLC 89     Lipid Panel  Recent Labs   Lab Test 01/09/20  0844 05/17/12  0925   CHOL 169 176    TRIG 70 57   HDL 46* 43*   * 122   VLDL  --  11   CHOLHDLRATIO  --  4.1   NHDL 123  --      HIV Screening  Recent Labs   Lab Test 01/09/20  0844   HIAGAB Nonreactive       Immunization History     Immunization History   Administered Date(s) Administered     TD (ADULT, 7+) 01/01/2002     TDAP Vaccine (Adacel) 06/17/2011          Chart documentation done in part with Dragon Voice recognition Software. Although reviewed after completion, some word and grammatical error may remain.    Phone call start time: 10:45 AM  Phone call end time: 11:04 AM    This visit is equivalent to a 88897 visit    Location of patient: home  Location of provider: home    Follow up:  follow up appointment scheduled to be in July    Raymundo Méndez MD  4/23/2020

## 2020-04-23 ENCOUNTER — VIRTUAL VISIT (OUTPATIENT)
Dept: RHEUMATOLOGY | Facility: CLINIC | Age: 40
End: 2020-04-23
Payer: COMMERCIAL

## 2020-04-23 DIAGNOSIS — M06.4 INFLAMMATORY POLYARTHROPATHY (H): Primary | ICD-10-CM

## 2020-04-23 PROCEDURE — 99213 OFFICE O/P EST LOW 20 MIN: CPT | Mod: 95 | Performed by: INTERNAL MEDICINE

## 2020-04-29 ENCOUNTER — TELEPHONE (OUTPATIENT)
Dept: RHEUMATOLOGY | Facility: CLINIC | Age: 40
End: 2020-04-29

## 2020-04-29 NOTE — TELEPHONE ENCOUNTER
Left VM to return clinics call for questions regarding appointment on 5/6/20 with Dr. Diggs. Patient just had a Rheumatology appointment on 4/23/20 and has a follow up appointment scheduled with Dr. Ross.for 7/28/20. Relay this information from Dr. Diggs to patient. Sandy Burns [facility]       Message   Received: Today   Message Contents   Nani Henley RN Winsor, Tina M            Previous Messages     ----- Message -----   From: Jose Ramon Diggs MD   Sent: 4/29/2020   1:56 PM CDT   To: Nani Henley RN, Sandy Burns, *     Hi there,    This patient had a telephone visit with Dr. Méndez on 4/23/2020 for possible rheumatoid arthritis.  He discussed with her all the possible options.  There is nothing different I would be able to offer her.  I do not know why she wants to see another provider in just few days.     Jose Ramon Diggs MD

## 2020-05-04 NOTE — TELEPHONE ENCOUNTER
Talked to patient and she thought she had already cancelled appointment because she got in earlier with another doctor. requesxted for appointment on 5/6/20 to be cancelled. Sandy Burns CMA

## 2020-07-25 ASSESSMENT — ENCOUNTER SYMPTOMS
HEARTBURN: 0
CONSTIPATION: 0
HEADACHES: 0
FREQUENCY: 0
COUGH: 0
PARESTHESIAS: 0
HEMATURIA: 0
NERVOUS/ANXIOUS: 0
NAUSEA: 0
JOINT SWELLING: 1
ARTHRALGIAS: 1
MYALGIAS: 1
WEAKNESS: 0
HEMATOCHEZIA: 0
EYE PAIN: 0
DYSURIA: 0
PALPITATIONS: 0
ABDOMINAL PAIN: 0
FEVER: 0
SHORTNESS OF BREATH: 0
CHILLS: 0
SORE THROAT: 0
BREAST MASS: 0
DIZZINESS: 0
DIARRHEA: 0

## 2020-07-27 NOTE — PROGRESS NOTES
SUBJECTIVE:   CC: Radha Navarro is an 40 year old woman who presents for preventive health visit.     Healthy Habits:     Getting at least 3 servings of Calcium per day:  NO    Bi-annual eye exam:  NO    Dental care twice a year:  NO    Sleep apnea or symptoms of sleep apnea:  None    Diet:  Other    Frequency of exercise:  None    Taking medications regularly:  Yes    Medication side effects:  Muscle aches    PHQ-2 Total Score: 0    Additional concerns today:  No      Patient/Parent of patient informed that anything we discuss that is not related to preventative medicine, may be billed for; patient verbalizes understanding.      No concerns brought up to Rooming staff. Elizabeth Morales MA       Today's PHQ-2 Score:   PHQ-2 (  [employer]) 2020   Q1: Little interest or pleasure in doing things 0   Q2: Feeling down, depressed or hopeless 0   PHQ-2 Score 0   Q1: Little interest or pleasure in doing things -   Q2: Feeling down, depressed or hopeless -   PHQ-2 Score -       Abuse: Current or Past(Physical, Sexual or Emotional)- No  Do you feel safe in your environment? Yes        Social History     Tobacco Use     Smoking status: Former Smoker     Packs/day: 0.00     Types: Cigarettes     Last attempt to quit: 2013     Years since quittin.9     Smokeless tobacco: Never Used   Substance Use Topics     Alcohol use: No     Comment: SOBER SINCE 9TH GRADE         Alcohol Use 2020   Prescreen: >3 drinks/day or >7 drinks/week? No   Prescreen: >3 drinks/day or >7 drinks/week? -       Reviewed orders with patient.  Reviewed health maintenance and updated orders accordingly - Yes  Lab work is in process  Labs reviewed in EPIC  BP Readings from Last 3 Encounters:   20 119/76   20 118/76   20 126/82    Wt Readings from Last 3 Encounters:   20 80.1 kg (176 lb 9.6 oz)   20 77.7 kg (171 lb 6.4 oz)   20 78 kg (172 lb)                  Patient Active Problem List   Diagnosis      CARDIOVASCULAR SCREENING; LDL GOAL LESS THAN 160     HDL lipoprotein deficiency     Ganglion cyst of wrist, Left side, recurrent     Migraines     Cervical pain     Headache     Transient insomnia     Cervical high risk HPV (human papillomavirus) test positive     Inflammatory polyarthropathy (H)     Past Surgical History:   Procedure Laterality Date     HC EXCIS PRIMARY GANGLION WRIST      x2 on left wrist; x1 on right side ()     TONSILLECTOMY & ADENOIDECTOMY  Age 23       Social History     Tobacco Use     Smoking status: Former Smoker     Packs/day: 0.00     Types: Cigarettes     Last attempt to quit: 2013     Years since quittin.9     Smokeless tobacco: Never Used   Substance Use Topics     Alcohol use: No     Comment: SOBER SINCE 9TH GRADE     Family History   Problem Relation Age of Onset     Lipids Father      Diabetes Maternal Grandmother      Diabetes Maternal Grandfather      Diabetes Paternal Grandmother      Diabetes Paternal Grandfather      Cancer Mother         Cervical?     Crohn's Disease Mother      Breast Cancer Maternal Aunt      Cancer Other         cousin has cervical cancer/cousin has cervical cancer     Cancer Maternal Aunt         cervical cancer     Cancer Maternal Aunt         cervical cancer         Current Outpatient Medications   Medication Sig Dispense Refill     levonorgestrel (KYLEENA) 19.5 MG IUD 1 each by Intrauterine route once Placed 3/19/18       naproxen (NAPROSYN) 500 MG tablet Take 500 mg by mouth       Allergies   Allergen Reactions     Ciprofloxacin      Oxycodone Itching     Recent Labs   Lab Test 20  0844 12  0925   * 122   HDL 46* 43*   TRIG 70 57        Mammogram Screening: Patient under age 50, mutual decision reflected in health maintenance.      Pertinent mammograms are reviewed under the imaging tab.  History of abnormal Pap smear: NO - age 30- 65 PAP every 3 years recommended  PAP / HPV Latest Ref Rng & Units 4/10/2019 2015  2012   PAP - NIL NIL NIL   HPV 16 DNA NEG:Negative Positive(A) Negative -   HPV 18 DNA NEG:Negative Negative Negative -   OTHER HR HPV NEG:Negative Positive(A) Negative -     Reviewed and updated as needed this visit by clinical staff  Tobacco  Allergies  Meds  Med Hx  Surg Hx  Fam Hx  Soc Hx        Reviewed and updated as needed this visit by Provider        Past Medical History:   Diagnosis Date     Anxiety      Cervical high risk HPV (human papillomavirus) test positive 04/10/2019    see problem list     Depression     Med free x 5 yrs (celexa, effexor)     IUD (intrauterine device) in place 2018     Sciatica      Shingles     x 2- triggered by high stress      Past Surgical History:   Procedure Laterality Date     HC EXCIS PRIMARY GANGLION WRIST      x2 on left wrist; x1 on right side ()     TONSILLECTOMY & ADENOIDECTOMY  Age 23     OB History    Para Term  AB Living   0 0 0 0 0 0   SAB TAB Ectopic Multiple Live Births   0 0 0 0 0       Review of Systems  CONSTITUTIONAL: NEGATIVE for fever, chills, change in weight  INTEGUMENTARU/SKIN: NEGATIVE for worrisome rashes, moles or lesions  EYES: NEGATIVE for vision changes or irritation  ENT: NEGATIVE for ear, mouth and throat problems  RESP: NEGATIVE for significant cough or SOB  BREAST: NEGATIVE for masses, tenderness or discharge  CV: NEGATIVE for chest pain, palpitations or peripheral edema  GI: NEGATIVE for nausea, abdominal pain, heartburn, or change in bowel habits  : NEGATIVE for unusual urinary or vaginal symptoms. Periods are regular.  MUSCULOSKELETAL: NEGATIVE for significant arthralgias or myalgia  NEURO: NEGATIVE for weakness, dizziness or paresthesias  ENDOCRINE: NEGATIVE for temperature intolerance, skin/hair changes  HEME/ALLERGY/IMMUNE: NEGATIVE for bleeding problems  PSYCHIATRIC: NEGATIVE for changes in mood or affect     OBJECTIVE:   /76   Pulse 86   Temp 97.9  F (36.6  C) (Tympanic)   Resp 16   Ht 1.662  "m (5' 5.45\")   Wt 80.1 kg (176 lb 9.6 oz)   SpO2 98%   BMI 28.98 kg/m    Physical Exam  GENERAL: healthy, alert and no distress  EYES: Eyes grossly normal to inspection, PERRL and conjunctivae and sclerae normal  HENT: ear canals and TM's normal, nose and mouth without ulcers or lesions  NECK: no adenopathy, no asymmetry, masses, or scars and thyroid normal to palpation  RESP: lungs clear to auscultation - no rales, rhonchi or wheezes  BREAST: normal without masses, tenderness or nipple discharge and no palpable axillary masses or adenopathy  CV: regular rate and rhythm, normal S1 S2, no S3 or S4, no murmur, click or rub, no peripheral edema and peripheral pulses strong  ABDOMEN: soft, nontender, no hepatosplenomegaly, no masses and bowel sounds normal   (female): normal female external genitalia, normal urethral meatus, vaginal mucosa pink, moist, well rugated, and normal cervix/adnexa/uterus without masses or discharge  MS: no gross musculoskeletal defects noted, no edema, no CVA tenderness  SKIN: no suspicious lesions or rashes  NEURO: Normal strength and tone, cranial nerves intact, mentation intact and speech normal  PSYCH: mentation appears normal, affect normal/bright  LYMPH: no cervical, supraclavicular, axillary, or inguinal adenopathy    Diagnostic Test Results:  Labs reviewed in Epic  See orders    ASSESSMENT/PLAN:       ICD-10-CM    1. Routine general medical examination at a health care facility  Z00.00    2. Screening for malignant neoplasm of cervix  Z12.4 Pap imaged thin layer screen with HPV - recommended age 30 - 65 years (select HPV order below)   3. Special screening examination for human papillomavirus (HPV)  Z11.51 HPV High Risk Types DNA Cervical   4. Screening for thyroid disorder  Z13.29    5. Screening for diabetes mellitus  Z13.1    6. Lipid screening  Z13.220    7. Cervical high risk HPV (human papillomavirus) test positive  R87.810    8. Family history of malignant neoplasm of " "breast  Z80.3 *MA Screening Digital Bilateral       COUNSELING:  Reviewed preventive health counseling, as reflected in patient instructions    Estimated body mass index is 28.98 kg/m  as calculated from the following:    Height as of this encounter: 1.662 m (5' 5.45\").    Weight as of this encounter: 80.1 kg (176 lb 9.6 oz).    Weight management plan: Discussed healthy diet and exercise guidelines     reports that she quit smoking about 6 years ago. Her smoking use included cigarettes. She smoked 0.00 packs per day. She has never used smokeless tobacco.      Counseling Resources:  ATP IV Guidelines  Pooled Cohorts Equation Calculator  Breast Cancer Risk Calculator  FRAX Risk Assessment  ICSI Preventive Guidelines  Dietary Guidelines for Americans, 2010  USDA's MyPlate  ASA Prophylaxis  Lung CA Screening    XIOMARA Ramirez  [facility]IR[facility]  "

## 2020-07-28 ENCOUNTER — OFFICE VISIT (OUTPATIENT)
Dept: FAMILY MEDICINE | Facility: CLINIC | Age: 40
End: 2020-07-28
Payer: COMMERCIAL

## 2020-07-28 VITALS
DIASTOLIC BLOOD PRESSURE: 76 MMHG | RESPIRATION RATE: 16 BRPM | TEMPERATURE: 97.9 F | HEART RATE: 86 BPM | OXYGEN SATURATION: 98 % | BODY MASS INDEX: 29.42 KG/M2 | WEIGHT: 176.6 LBS | HEIGHT: 65 IN | SYSTOLIC BLOOD PRESSURE: 119 MMHG

## 2020-07-28 DIAGNOSIS — Z13.220 LIPID SCREENING: ICD-10-CM

## 2020-07-28 DIAGNOSIS — Z11.51 SPECIAL SCREENING EXAMINATION FOR HUMAN PAPILLOMAVIRUS (HPV): ICD-10-CM

## 2020-07-28 DIAGNOSIS — Z12.4 SCREENING FOR MALIGNANT NEOPLASM OF CERVIX: ICD-10-CM

## 2020-07-28 DIAGNOSIS — Z13.29 SCREENING FOR THYROID DISORDER: ICD-10-CM

## 2020-07-28 DIAGNOSIS — Z13.1 SCREENING FOR DIABETES MELLITUS: ICD-10-CM

## 2020-07-28 DIAGNOSIS — Z80.3 FAMILY HISTORY OF MALIGNANT NEOPLASM OF BREAST: ICD-10-CM

## 2020-07-28 DIAGNOSIS — R87.810 CERVICAL HIGH RISK HPV (HUMAN PAPILLOMAVIRUS) TEST POSITIVE: ICD-10-CM

## 2020-07-28 DIAGNOSIS — Z00.00 ROUTINE GENERAL MEDICAL EXAMINATION AT A HEALTH CARE FACILITY: Primary | ICD-10-CM

## 2020-07-28 PROCEDURE — 99396 PREV VISIT EST AGE 40-64: CPT | Performed by: NURSE PRACTITIONER

## 2020-07-28 PROCEDURE — 87624 HPV HI-RISK TYP POOLED RSLT: CPT | Performed by: NURSE PRACTITIONER

## 2020-07-28 PROCEDURE — G0145 SCR C/V CYTO,THINLAYER,RESCR: HCPCS | Performed by: NURSE PRACTITIONER

## 2020-07-28 ASSESSMENT — ANXIETY QUESTIONNAIRES
1. FEELING NERVOUS, ANXIOUS, OR ON EDGE: NOT AT ALL
5. BEING SO RESTLESS THAT IT IS HARD TO SIT STILL: NOT AT ALL
6. BECOMING EASILY ANNOYED OR IRRITABLE: NOT AT ALL
7. FEELING AFRAID AS IF SOMETHING AWFUL MIGHT HAPPEN: NOT AT ALL
GAD7 TOTAL SCORE: 0
3. WORRYING TOO MUCH ABOUT DIFFERENT THINGS: NOT AT ALL
2. NOT BEING ABLE TO STOP OR CONTROL WORRYING: NOT AT ALL

## 2020-07-28 ASSESSMENT — PATIENT HEALTH QUESTIONNAIRE - PHQ9
5. POOR APPETITE OR OVEREATING: NOT AT ALL
SUM OF ALL RESPONSES TO PHQ QUESTIONS 1-9: 4

## 2020-07-28 ASSESSMENT — MIFFLIN-ST. JEOR: SCORE: 1479.12

## 2020-07-29 ASSESSMENT — ANXIETY QUESTIONNAIRES: GAD7 TOTAL SCORE: 0

## 2020-07-30 LAB
COPATH REPORT: NORMAL
PAP: NORMAL

## 2020-08-03 ENCOUNTER — PATIENT OUTREACH (OUTPATIENT)
Dept: FAMILY MEDICINE | Facility: CLINIC | Age: 40
End: 2020-08-03

## 2020-08-03 DIAGNOSIS — R87.810 CERVICAL HIGH RISK HPV (HUMAN PAPILLOMAVIRUS) TEST POSITIVE: ICD-10-CM

## 2020-08-03 NOTE — TELEPHONE ENCOUNTER
Care team will call pt to schedule a colp.    I spoke with the patient and informed her of her result and the recommended f/u of a colposcopy. I answered the patient's questions. The patient verbalizes understanding and agrees with the plan. The following items were discussed with the patient:      - Colposcopy procedure  - You may take 600 mg Ibuprofen 1 hour before the exam if you are not allergic to it.    - Try to schedule the colposcopy when you're not expecting a period. Light bleeding is ok the day of the colposcopy. Any bleeding more than that you would need to reschedule the appointment.   - Nothing vaginally 24 hours before - no sex, no douching, no vaginal creams, gels or vaginal medications.   Pt has an IUD.    I encouraged pt to call me with any other questions she may have.      Noris King RN  Pap Tracking

## 2020-08-03 NOTE — TELEPHONE ENCOUNTER
4/10/19 NIL Pap, + HR HPV 16 & other (Neg 18). Plan colp  4/19/19 [facility] - Negative. Plan cotest in 1 year.   4/13/20 COVID 19 interim plan updated to: Plan unchanged.     7/28/20 NIL Pap, + HR HPV 16 & other (neg 18). [facility] due by 10/28/20.   8/3/20 Pt will call me back.     Noris King RN  Pap Tracking

## 2020-08-13 ENCOUNTER — OFFICE VISIT (OUTPATIENT)
Dept: OBGYN | Facility: CLINIC | Age: 40
End: 2020-08-13
Payer: COMMERCIAL

## 2020-08-13 VITALS
HEART RATE: 84 BPM | WEIGHT: 172 LBS | SYSTOLIC BLOOD PRESSURE: 122 MMHG | HEIGHT: 65 IN | DIASTOLIC BLOOD PRESSURE: 84 MMHG | BODY MASS INDEX: 28.66 KG/M2

## 2020-08-13 DIAGNOSIS — R87.810 CERVICAL HIGH RISK HPV (HUMAN PAPILLOMAVIRUS) TEST POSITIVE: ICD-10-CM

## 2020-08-13 PROCEDURE — 57452 EXAM OF CERVIX W/SCOPE: CPT | Performed by: OBSTETRICS & GYNECOLOGY

## 2020-08-13 PROCEDURE — 99212 OFFICE O/P EST SF 10 MIN: CPT | Mod: 25 | Performed by: OBSTETRICS & GYNECOLOGY

## 2020-08-13 ASSESSMENT — MIFFLIN-ST. JEOR: SCORE: 1457.42

## 2020-08-13 NOTE — PATIENT INSTRUCTIONS
If you have any questions regarding your visit, Please contact your care team.    [facility]: [phone]      Women s Health CLINIC HOURS TELEPHONE NUMBER   [address] CMA Susie -    GANESH Luu       Monday:       7:30-4:15 [place]  Wednesday:      Administrative  Thursday:       7:30-4:15 [place]  Friday:       Administrative     [facility]  [address] [place]  [address]  [phone] ask for Women's [facility]  [address] [place] [address]  [phone] ask for [facility]    Imaging Scheduling for [place]:  [phone]    Imaging Scheduling for [place]: [phone]       Urgent Care locations:    [facility] Saturday and Sunday   9 am - 5 pm    Monday-Friday   12 pm - 8 pm  Saturday and Sunday   9 am - 5 pm   [phone]     [facility] Labor and Delivery:  [phone]    If you need a medication refill, please contact your pharmacy. Please allow 3 business days for your refill to be completed.  As always, Thank you for trusting us with your healthcare needs!

## 2020-08-13 NOTE — PROGRESS NOTES
Radha presents for colposcopy. Pap showed: Normal HR HPV+.     PMH/PSH/Meds/Allergies reviewed & documented in [facility].    I discussed with the patient the results of her pap smear and/or HPV studies.    I discussed our current understanding of abnormal cytology and the role hpv can play in pre-cancerous cervical change.  I explained the current cytological/histologic terminology.      We discussed the screening nature of pap smears and the need for a more definitive examination.    She is given the opportunity to ask questions and have them answered.  She does agree to proceed with colposcopy.    Procedure for colposcopy and biopsy has been explained to the   patient and consent obtained.    PROCEDURE:  Speculum placed in vagina and excellent visualization of cervix achieved, cervix swabbed  with acetic acid solution.    FINDINGS:  Cervix: no visible lesions, no mosaicism, no punctation and no abnormal vasculature; SCJ visualized 360 degrees without lesions and no biopsies taken.    Vaginal inspection: vaginal colposcopy not performed.    Vulvar colposcopy: vulvar colposcopy not performed.    Procedure Summary: Patient tolerated procedure well and colposcopy adequate.    Colposcopic Impression: HPV effect    10 minutes out of a total of 15 minutes of face to face time was spent in discussion regarding her pap and HPV status.  This was in addition to the time required for the procedure.     Plan:  Co-testing in 1 year.    Betito Mcdowell MD FACOG

## 2020-08-20 ENCOUNTER — PATIENT OUTREACH (OUTPATIENT)
Dept: OBGYN | Facility: CLINIC | Age: 40
End: 2020-08-20

## 2020-08-20 DIAGNOSIS — R87.810 CERVICAL HIGH RISK HPV (HUMAN PAPILLOMAVIRUS) TEST POSITIVE: ICD-10-CM

## 2020-08-21 ENCOUNTER — ANCILLARY PROCEDURE (OUTPATIENT)
Dept: MAMMOGRAPHY | Facility: CLINIC | Age: 40
End: 2020-08-21
Payer: COMMERCIAL

## 2020-08-21 DIAGNOSIS — Z80.3 FAMILY HISTORY OF MALIGNANT NEOPLASM OF BREAST: ICD-10-CM

## 2020-08-21 PROCEDURE — 77067 SCR MAMMO BI INCL CAD: CPT | Mod: TC

## 2020-08-21 PROCEDURE — 77063 BREAST TOMOSYNTHESIS BI: CPT | Mod: TC

## 2020-09-04 ENCOUNTER — MYC MEDICAL ADVICE (OUTPATIENT)
Dept: FAMILY MEDICINE | Facility: CLINIC | Age: 40
End: 2020-09-04

## 2020-09-11 NOTE — TELEPHONE ENCOUNTER
4/10/19 NIL Pap, + HR HPV 16 & other (Neg 18). Plan colp  4/19/19 [facility] - Negative. Plan cotest in 1 year.   4/13/20 COVID 19 interim plan updated to: Plan unchanged.     7/28/20 NIL Pap, + HR HPV 16 & other (neg 18). [facility] due by 10/28/20.   8/3/20 Pt will call me back. Pt notified by phone.   08/13/20 [facility] visually normal; no biopsies taken. Plan cotest in 1 year, due 7/28/21.

## 2020-12-13 ENCOUNTER — HEALTH MAINTENANCE LETTER (OUTPATIENT)
Age: 40
End: 2020-12-13

## 2021-04-22 ENCOUNTER — TELEPHONE (OUTPATIENT)
Dept: OBGYN | Facility: CLINIC | Age: 41
End: 2021-04-22

## 2021-04-22 NOTE — TELEPHONE ENCOUNTER
Pt called in asking when she is due to have her Kyleena IUD replaced.  Per our records, she had a Kyleena IUD placed on 3/19/2018.  Let pt know that the Kyleena IUD is good for 5 years and she should have it replaced around 3/19/2023.    Pt states she typically gets spotting monthly.  However, she was concerned because after her normal spotting she had a week of no spotting and then a day of spotting and then nothing again.  She didn't know if this additional spotting was indicating she needs her IUD replaced.  Pt denies vaginal itching, burning or a foul smelling discharge.  She denies having intercourse or heavy lifting prior to noticing this additional spotting.  Advised that this irregular bleeding can be common and since she has no other symptoms and the spotting only lasted for a day and she does not need it replaced for a couple years to continue to monitor.    Pt verbalized understanding and agreed to plan.    Shellie Salas RN

## 2021-07-09 ENCOUNTER — PATIENT OUTREACH (OUTPATIENT)
Dept: FAMILY MEDICINE | Facility: CLINIC | Age: 41
End: 2021-07-09

## 2021-07-09 DIAGNOSIS — R87.810 CERVICAL HIGH RISK HPV (HUMAN PAPILLOMAVIRUS) TEST POSITIVE: ICD-10-CM

## 2021-09-26 ENCOUNTER — HEALTH MAINTENANCE LETTER (OUTPATIENT)
Age: 41
End: 2021-09-26

## 2021-09-27 ASSESSMENT — ENCOUNTER SYMPTOMS
ARTHRALGIAS: 0
ABDOMINAL PAIN: 0
BREAST MASS: 0
SHORTNESS OF BREATH: 0
DYSURIA: 0
PARESTHESIAS: 0
MYALGIAS: 1
NERVOUS/ANXIOUS: 0
HEMATURIA: 0
COUGH: 0
HEADACHES: 0
DIZZINESS: 0
FEVER: 0
SORE THROAT: 0
FREQUENCY: 0
EYE PAIN: 0
WEAKNESS: 0
JOINT SWELLING: 0
HEARTBURN: 0
PALPITATIONS: 0
NAUSEA: 0
DIARRHEA: 1
CONSTIPATION: 0
HEMATOCHEZIA: 0
CHILLS: 0

## 2021-09-27 NOTE — PATIENT INSTRUCTIONS
Please make appointment for a mammogram [phone].   Please return stool samples for chronic diarrhea work-up.  If normal, I would suggest a diagnostic colonoscopy.   Return for fasting lab draw at your convenience.       Preventive Health Recommendations  Female Ages 40 to 49    Yearly exam:     See your health care provider every year in order to  1. Review health changes.   2. Discuss preventive care.    3. Review your medicines if your doctor prescribed any.      Get a Pap test every three years (unless you have an abnormal result and your provider advises testing more often).      If you get Pap tests with HPV test, you only need to test every 5 years, unless you have an abnormal result. You do not need a Pap test if your uterus was removed (hysterectomy) and you have not had cancer.      You should be tested each year for STDs (sexually transmitted diseases), if you're at risk.     Ask your doctor if you should have a mammogram.      Have a colonoscopy (test for colon cancer) if someone in your family has had colon cancer or polyps before age 50.       Have a cholesterol test every 5 years.       Have a diabetes test (fasting glucose) after age 45. If you are at risk for diabetes, you should have this test every 3 years.    Shots: Get a flu shot each year. Get a tetanus shot every 10 years.     Nutrition:     Eat at least 5 servings of fruits and vegetables each day.    Eat whole-grain bread, whole-wheat pasta and brown rice instead of white grains and rice.    Get adequate Calcium and Vitamin D.      Lifestyle    Exercise at least 150 minutes a week (an average of 30 minutes a day, 5 days a week). This will help you control your weight and prevent disease.    Limit alcohol to one drink per day.    No smoking.     Wear sunscreen to prevent skin cancer.    See your dentist every six months for an exam and cleaning.

## 2021-09-27 NOTE — PROGRESS NOTES
SUBJECTIVE:   CC: Radha Navarro is an 41 year old woman who presents for preventive health visit.     {Split Bill scripting  The purpose of this visit is to discuss your medical history and prevent health problems before you are sick. You may be responsible for a co-pay, coinsurance, or deductible if your visit today includes services such as checking on a sore throat, having an x-ray or lab test, or treating and evaluating a new or existing condition :752761}  Patient has been advised of split billing requirements and indicates understanding: {Yes and No:553404}  HPI  {Add if <65 person on Medicare  - Required Questions (Optional):537576}  {Outside tests to abstract? :731448}    {additional problems to add (Optional):857648}    Today's PHQ-2 Score:   PHQ-2 (  [employer]) 2021   Q1: Little interest or pleasure in doing things 0   Q2: Feeling down, depressed or hopeless 0   PHQ-2 Score 0   Q1: Little interest or pleasure in doing things Not at all   Q2: Feeling down, depressed or hopeless Not at all   PHQ-2 Score 0       Abuse: Current or Past (Physical, Sexual or Emotional) - { :846657}  Do you feel safe in your environment? { :860745}    Have you ever done Advance Care Planning? (For example, a Health Directive, POLST, or a discussion with a medical provider or your loved ones about your wishes): { :286622}    Social History     Tobacco Use     Smoking status: Former Smoker     Packs/day: 0.00     Types: Cigarettes     Quit date: 2013     Years since quittin.1     Smokeless tobacco: Never Used   Substance Use Topics     Alcohol use: No     Comment: SOBER SINCE 9TH GRADE     {Rooming Staff- Complete this question if Prescreen response is not shown below for today's visit. If you drink alcohol do you typically have >3 drinks per day or >7 drinks per week? (Optional):757664}    Alcohol Use 2021   Prescreen: >3 drinks/day or >7 drinks/week? Not Applicable   Prescreen: >3 drinks/day or >7  "drinks/week? -   {add AUDIT responses (Optional) (A score of 7 for adult men is an indication of hazardous drinking; a score of 8 or more is an indication of an alcohol use disorder.  A score of 7 or more for adult women is an indication of hazardous drinking or an alchohol use disorder):523933}    Reviewed orders with patient.  Reviewed health maintenance and updated orders accordingly - { :233493::\"Yes\"}  {Chronicprobdata (optional):358872}    Breast Cancer Screening:    FHS-7:   Breast CA Risk Assessment (FHS-7) 9/27/2021   Did any of your first-degree relatives have breast or ovarian cancer? Yes   Did any of your relatives have bilateral breast cancer? Unknown   Did any man in your family have breast cancer? No   Did any woman in your family have breast and ovarian cancer? Yes   Did any woman in your family have breast cancer before age 50 y? No   Do you have 2 or more relatives with breast and/or ovarian cancer? Yes   Do you have 2 or more relatives with breast and/or bowel cancer? No     {If any of the questions to the BCRA (FHS-7) are answered yes, consider ordering referral for genetic counseling (Optional) :318484::\"click delete button to remove this line now\"}  {AMB Mammogram Decision Support (Optional) :918716}  Pertinent mammograms are reviewed under the imaging tab.    History of abnormal Pap smear: { :928509}  PAP / HPV Latest Ref Rng & Units 7/28/2020 4/10/2019 9/11/2015   PAP (Historical) - NIL NIL NIL   HPV16 NEG:Negative Positive(A) Positive(A) Negative   HPV18 NEG:Negative Negative Negative Negative   HRHPV NEG:Negative Positive(A) Positive(A) Negative     Reviewed and updated as needed this visit by clinical staff                 Reviewed and updated as needed this visit by Provider                {HISTORY OPTIONS (Optional):094139}    Review of Systems  {FEMALE ROS (Optional):832170}     OBJECTIVE:   There were no vitals taken for this visit.  Physical Exam  {Exam Choices " "(Optional):537256}    {Diagnostic Test Results (Optional):678367::\"Diagnostic Test Results:\",\"Labs reviewed in Epic\"}    ASSESSMENT/PLAN:   {Diag Picklist:939521}    Patient has been advised of split billing requirements and indicates understanding: {YES / NO:801417::\"Yes\"}  COUNSELING:  {FEMALE COUNSELING MESSAGES:856009::\"Reviewed preventive health counseling, as reflected in patient instructions\"}    Estimated body mass index is 28.27 kg/m  as calculated from the following:    Height as of 8/13/20: 1.661 m (5' 5.4\").    Weight as of 8/13/20: 78 kg (172 lb).    {Weight Management Plan (ACO) Complete if BMI is abnormal-  Ages 18-64  BMI >24.9.  Age 65+ with BMI <23 or >30 (Optional):946900}    She reports that she quit smoking about 8 years ago. Her smoking use included cigarettes. She smoked 0.00 packs per day. She has never used smokeless tobacco.      Counseling Resources:  ATP IV Guidelines  Pooled Cohorts Equation Calculator  Breast Cancer Risk Calculator  BRCA-Related Cancer Risk Assessment: FHS-7 Tool  FRAX Risk Assessment  ICSI Preventive Guidelines  Dietary Guidelines for Americans, 2010  USDA's MyPlate  ASA Prophylaxis  Lung CA Screening    Ruthy Britton, [facility]  "

## 2021-09-28 ENCOUNTER — OFFICE VISIT (OUTPATIENT)
Dept: FAMILY MEDICINE | Facility: CLINIC | Age: 41
End: 2021-09-28
Payer: COMMERCIAL

## 2021-09-28 VITALS
HEART RATE: 86 BPM | WEIGHT: 166 LBS | SYSTOLIC BLOOD PRESSURE: 108 MMHG | OXYGEN SATURATION: 99 % | TEMPERATURE: 97.4 F | BODY MASS INDEX: 26.68 KG/M2 | DIASTOLIC BLOOD PRESSURE: 75 MMHG | HEIGHT: 66 IN

## 2021-09-28 DIAGNOSIS — F40.231 SEVERE NEEDLE PHOBIA: ICD-10-CM

## 2021-09-28 DIAGNOSIS — Z00.00 ROUTINE GENERAL MEDICAL EXAMINATION AT A HEALTH CARE FACILITY: Primary | ICD-10-CM

## 2021-09-28 DIAGNOSIS — K52.9 CHRONIC DIARRHEA: ICD-10-CM

## 2021-09-28 DIAGNOSIS — Z13.220 LIPID SCREENING: ICD-10-CM

## 2021-09-28 DIAGNOSIS — Z12.4 SCREENING FOR MALIGNANT NEOPLASM OF CERVIX: ICD-10-CM

## 2021-09-28 DIAGNOSIS — Z11.59 NEED FOR HEPATITIS C SCREENING TEST: ICD-10-CM

## 2021-09-28 DIAGNOSIS — Z12.31 ENCOUNTER FOR SCREENING MAMMOGRAM FOR BREAST CANCER: ICD-10-CM

## 2021-09-28 DIAGNOSIS — Z13.1 SCREENING FOR DIABETES MELLITUS: ICD-10-CM

## 2021-09-28 DIAGNOSIS — M06.4 INFLAMMATORY POLYARTHROPATHY (H): ICD-10-CM

## 2021-09-28 PROCEDURE — 99213 OFFICE O/P EST LOW 20 MIN: CPT | Mod: 25 | Performed by: NURSE PRACTITIONER

## 2021-09-28 PROCEDURE — 99396 PREV VISIT EST AGE 40-64: CPT | Performed by: NURSE PRACTITIONER

## 2021-09-28 PROCEDURE — 87624 HPV HI-RISK TYP POOLED RSLT: CPT | Performed by: NURSE PRACTITIONER

## 2021-09-28 PROCEDURE — G0145 SCR C/V CYTO,THINLAYER,RESCR: HCPCS | Performed by: NURSE PRACTITIONER

## 2021-09-28 RX ORDER — ALPRAZOLAM 0.5 MG
0.5 TABLET ORAL
Qty: 1 TABLET | Refills: 0 | Status: SHIPPED | OUTPATIENT
Start: 2021-09-28

## 2021-09-28 ASSESSMENT — ENCOUNTER SYMPTOMS
NAUSEA: 0
JOINT SWELLING: 0
HEMATOCHEZIA: 0
EYE PAIN: 0
PALPITATIONS: 0
HEADACHES: 0
DIZZINESS: 0
MYALGIAS: 1
SHORTNESS OF BREATH: 0
DYSURIA: 0
COUGH: 0
PARESTHESIAS: 0
NERVOUS/ANXIOUS: 0
FEVER: 0
ABDOMINAL PAIN: 0
HEARTBURN: 0
SORE THROAT: 0
DIARRHEA: 1
WEAKNESS: 0
CHILLS: 0
BREAST MASS: 0
CONSTIPATION: 0
ARTHRALGIAS: 0
HEMATURIA: 0
FREQUENCY: 0

## 2021-09-28 ASSESSMENT — ANXIETY QUESTIONNAIRES
GAD7 TOTAL SCORE: 0
3. WORRYING TOO MUCH ABOUT DIFFERENT THINGS: NOT AT ALL
7. FEELING AFRAID AS IF SOMETHING AWFUL MIGHT HAPPEN: NOT AT ALL
GAD7 TOTAL SCORE: 0
5. BEING SO RESTLESS THAT IT IS HARD TO SIT STILL: NOT AT ALL
4. TROUBLE RELAXING: NOT AT ALL
2. NOT BEING ABLE TO STOP OR CONTROL WORRYING: NOT AT ALL
1. FEELING NERVOUS, ANXIOUS, OR ON EDGE: NOT AT ALL
7. FEELING AFRAID AS IF SOMETHING AWFUL MIGHT HAPPEN: NOT AT ALL
8. IF YOU CHECKED OFF ANY PROBLEMS, HOW DIFFICULT HAVE THESE MADE IT FOR YOU TO DO YOUR WORK, TAKE CARE OF THINGS AT HOME, OR GET ALONG WITH OTHER PEOPLE?: NOT DIFFICULT AT ALL
6. BECOMING EASILY ANNOYED OR IRRITABLE: NOT AT ALL
GAD7 TOTAL SCORE: 0

## 2021-09-28 ASSESSMENT — MIFFLIN-ST. JEOR: SCORE: 1426.78

## 2021-09-28 ASSESSMENT — PATIENT HEALTH QUESTIONNAIRE - PHQ9
10. IF YOU CHECKED OFF ANY PROBLEMS, HOW DIFFICULT HAVE THESE PROBLEMS MADE IT FOR YOU TO DO YOUR WORK, TAKE CARE OF THINGS AT HOME, OR GET ALONG WITH OTHER PEOPLE: NOT DIFFICULT AT ALL
SUM OF ALL RESPONSES TO PHQ QUESTIONS 1-9: 0
SUM OF ALL RESPONSES TO PHQ QUESTIONS 1-9: 0

## 2021-09-28 NOTE — PROGRESS NOTES
SUBJECTIVE:   CC: Radha Navarro is an 41 year old woman who presents for preventive health visit.     Patient is here for physical.  New patient to me.     Patient reports she has had some chronic symptoms since 2019, after she took Cipro for a UTI.  She reports she developed diffuse pain and joint swelling.  She also developed chronic diarrhea.  States symptoms occurred after taking just 1 dose of Cipro.  Followed with rheumatology for a period of time, RA was suspected.  She had improvement with prednisone taper.  Other treatment options were discussed but she declined.  She really hates needles, states she cannot stand to get blood drawn and becomes angry.  This deterred her from starting other types of medication because she would need lab monitoring. She doesn't agree with the inflammatory polyarthropathy diagnosis and believes it is a side effect from Cipro.  Since then she has been following with a naturopath and started on some probiotics.  She states her joint pain and swelling has greatly improved. Unfortunately she continues to have chronic diarrhea. States diarrhea occurs every day, several times per day.  No formed stools.  Denies any abdominal pain, nausea or vomiting.  Denies blood or mucous in stool.  Hasn't had work-up for chronic diarrhea.  Her mom has Crohns disease.    Due for pap today.       Patient has been advised of split billing requirements and indicates understanding: Yes  Healthy Habits:     Getting at least 3 servings of Calcium per day:  Yes    Bi-annual eye exam:  Yes    Dental care twice a year:  NO    Sleep apnea or symptoms of sleep apnea:  None    Diet:  Vegetarian/vegan    Frequency of exercise:  2-3 days/week    Duration of exercise:  15-30 minutes    Taking medications regularly:  No    Medication side effects:  None    PHQ-2 Total Score: 0    Additional concerns today:  No      Today's PHQ-2 Score:   PHQ-2 ( 1999 [employer]) 9/27/2021   Q1: Little interest or pleasure in  doing things 0   Q2: Feeling down, depressed or hopeless 0   PHQ-2 Score 0   Q1: Little interest or pleasure in doing things Not at all   Q2: Feeling down, depressed or hopeless Not at all   PHQ-2 Score 0       Abuse: Current or Past (Physical, Sexual or Emotional) - No  Do you feel safe in your environment? Yes    Have you ever done Advance Care Planning? (For example, a Health Directive, POLST, or a discussion with a medical provider or your loved ones about your wishes): Yes, patient states has an Advance Care Planning document and will bring a copy to the clinic.    Social History     Tobacco Use     Smoking status: Former Smoker     Packs/day: 0.00     Types: Cigarettes     Quit date: 2013     Years since quittin.1     Smokeless tobacco: Never Used   Substance Use Topics     Alcohol use: No     Comment: SOBER SINCE 9TH GRADE     If you drink alcohol do you typically have >3 drinks per day or >7 drinks per week? No    Alcohol Use 2021   Prescreen: >3 drinks/day or >7 drinks/week? -   Prescreen: >3 drinks/day or >7 drinks/week? No   Answers for HPI/ROS submitted by the patient on 2021  If you checked off any problems, how difficult have these problems made it for you to do your work, take care of things at home, or get along with other people?: Not difficult at all  PHQ9 TOTAL SCORE: 0  MARK 7 TOTAL SCORE: 0      Reviewed orders with patient.  Reviewed health maintenance and updated orders accordingly - Yes      Breast Cancer Screening:    FHS-7:   Breast CA Risk Assessment (FHS-7) 2021   Did any of your first-degree relatives have breast or ovarian cancer? Maternal aunt has breast cancer   Did any of your relatives have bilateral breast cancer? Unknown   Did any man in your family have breast cancer? No   Did any woman in your family have breast and ovarian cancer? No   Did any woman in your family have breast cancer before age 50 y? No   Do you have 2 or more relatives with breast and/or  "ovarian cancer? No   Do you have 2 or more relatives with breast and/or bowel cancer? No       Patient under 40 years of age: Routine Mammogram Screening not recommended.   Pertinent mammograms are reviewed under the imaging tab.    History of abnormal Pap smear: YES - updated in Problem List and Health Maintenance accordingly  PAP / HPV Latest Ref Rng & Units 7/28/2020 4/10/2019 9/11/2015   PAP (Historical) - NIL NIL NIL   HPV16 NEG:Negative Positive(A) Positive(A) Negative   HPV18 NEG:Negative Negative Negative Negative   HRHPV NEG:Negative Positive(A) Positive(A) Negative     Reviewed and updated as needed this visit by clinical staff  Tobacco  Allergies  Meds  Problems  Med Hx  Surg Hx  Fam Hx          Reviewed and updated as needed this visit by Provider  Tobacco  Allergies  Meds  Problems  Med Hx  Surg Hx  Fam Hx             Review of Systems   Constitutional: Negative for chills and fever.   HENT: Negative for congestion, ear pain, hearing loss and sore throat.    Eyes: Negative for pain and visual disturbance.   Respiratory: Negative for cough and shortness of breath.    Cardiovascular: Positive for peripheral edema. Negative for chest pain and palpitations.   Gastrointestinal: Positive for diarrhea. Negative for abdominal pain, constipation, heartburn, hematochezia and nausea.   Breasts:  Negative for tenderness, breast mass and discharge.   Genitourinary: Negative for dysuria, frequency, genital sores, hematuria, pelvic pain, urgency, vaginal bleeding and vaginal discharge.   Musculoskeletal: Positive for myalgias. Negative for arthralgias and joint swelling.   Skin: Negative for rash.   Neurological: Negative for dizziness, weakness, headaches and paresthesias.   Psychiatric/Behavioral: Negative for mood changes. The patient is not nervous/anxious.           OBJECTIVE:   /75   Pulse 86   Temp 97.4  F (36.3  C)   Ht 1.664 m (5' 5.5\")   Wt 75.3 kg (166 lb)   SpO2 99%   BMI 27.20 " kg/m    Physical Exam  GENERAL: healthy, alert and no distress  EYES: Eyes grossly normal to inspection, PERRL and conjunctivae and sclerae normal  HENT: ear canals and TM's normal, nose and mouth without ulcers or lesions  NECK: no adenopathy, no asymmetry, masses, or scars and thyroid normal to palpation  RESP: lungs clear to auscultation - no rales, rhonchi or wheezes  BREAST: normal without masses, tenderness or nipple discharge and no palpable axillary masses or adenopathy  CV: regular rate and rhythm, normal S1 S2, no S3 or S4, no murmur, click or rub, no peripheral edema and peripheral pulses strong  ABDOMEN: soft, nontender, no hepatosplenomegaly, no masses and bowel sounds normal   (female): normal female external genitalia, normal urethral meatus, vaginal mucosa pink, moist, well rugated, and normal cervix/adnexa/uterus without masses or discharge  MS: no gross musculoskeletal defects noted, no edema  SKIN: no suspicious lesions or rashes  NEURO: Normal strength and tone, mentation intact and speech normal  PSYCH: mentation appears normal, affect normal/bright    Diagnostic Test Results:  Labs reviewed in Epic    ASSESSMENT/PLAN:   (Z00.00) Routine general medical examination at a health care facility  (primary encounter diagnosis)  Comment:   Plan: continue annual exams.     (K52.9) Chronic diarrhea  Comment: States this started after taking Cipro in 2019.   Plan: Clostridium difficile Toxin B PCR, Ova and         Parasite Exam Routine, Enteric Bacteria and         Virus Panel by [facility] Stool        Check labs.  Consider diagnostic colonoscopy if labs are normal.      (M06.4) Inflammatory polyarthropathy (H)  Comment: last saw rheumatology in 2020.  She is not interested in medication, I believe in part due to fear of lab draws.  She has had decreased pain/swelling since following with an naturopath.   Plan: continue to monitor.    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  She will return for labs at another time.       Patient has been advised of split billing requirements and indicates understanding: Yes  COUNSELING:  Reviewed preventive health counseling, as reflected in patient instructions    Estimated body mass index is 27.2 kg/m  as calculated from the following:    Height as of this encounter: 1.664 m (5' 5.5\").    Weight as of this encounter: 75.3 kg (166 lb).      She reports that she quit smoking about 8 years ago. Her smoking use included cigarettes. She smoked 0.00 packs per day. She has never used smokeless tobacco.      Counseling Resources:  ATP IV Guidelines  Pooled Cohorts Equation Calculator  Breast Cancer Risk Calculator  BRCA-Related Cancer Risk Assessment: FHS-7 Tool  FRAX Risk Assessment  ICSI Preventive Guidelines  Dietary Guidelines for Americans, 2010  USDA's MyPlate  ASA Prophylaxis  Lung CA Screening    Ruthy Britton CNP  [facility]  "

## 2021-09-29 ASSESSMENT — PATIENT HEALTH QUESTIONNAIRE - PHQ9: SUM OF ALL RESPONSES TO PHQ QUESTIONS 1-9: 0

## 2021-09-29 ASSESSMENT — ANXIETY QUESTIONNAIRES: GAD7 TOTAL SCORE: 0

## 2021-09-30 LAB
BKR LAB AP GYN ADEQUACY: NORMAL
BKR LAB AP GYN INTERPRETATION: NORMAL
BKR LAB AP HPV REFLEX: NORMAL
BKR LAB AP PREVIOUS ABNL DX: NORMAL
BKR LAB AP PREVIOUS ABNORMAL: NORMAL
PATH REPORT.COMMENTS IMP SPEC: NORMAL
PATH REPORT.RELEVANT HX SPEC: NORMAL

## 2021-10-04 ENCOUNTER — PATIENT OUTREACH (OUTPATIENT)
Dept: FAMILY MEDICINE | Facility: CLINIC | Age: 41
End: 2021-10-04

## 2021-10-04 LAB
HUMAN PAPILLOMA VIRUS 16 DNA: NEGATIVE
HUMAN PAPILLOMA VIRUS 18 DNA: NEGATIVE
HUMAN PAPILLOMA VIRUS FINAL DIAGNOSIS: ABNORMAL
HUMAN PAPILLOMA VIRUS OTHER HR: POSITIVE

## 2021-10-28 ENCOUNTER — LAB (OUTPATIENT)
Dept: LAB | Facility: CLINIC | Age: 41
End: 2021-10-28
Payer: COMMERCIAL

## 2021-10-28 DIAGNOSIS — Z11.59 NEED FOR HEPATITIS C SCREENING TEST: ICD-10-CM

## 2021-10-28 DIAGNOSIS — Z13.220 LIPID SCREENING: ICD-10-CM

## 2021-10-28 DIAGNOSIS — Z13.1 SCREENING FOR DIABETES MELLITUS: ICD-10-CM

## 2021-10-28 LAB
CHOLEST SERPL-MCNC: 193 MG/DL
FASTING STATUS PATIENT QL REPORTED: ABNORMAL
FASTING STATUS PATIENT QL REPORTED: NORMAL
GLUCOSE BLD-MCNC: 89 MG/DL (ref 70–99)
HCV AB SERPL QL IA: NONREACTIVE
HDLC SERPL-MCNC: 48 MG/DL
LDLC SERPL CALC-MCNC: 109 MG/DL
NONHDLC SERPL-MCNC: 145 MG/DL
TRIGL SERPL-MCNC: 182 MG/DL

## 2021-10-28 PROCEDURE — 82947 ASSAY GLUCOSE BLOOD QUANT: CPT

## 2021-10-28 PROCEDURE — 36415 COLL VENOUS BLD VENIPUNCTURE: CPT

## 2021-10-28 PROCEDURE — 86803 HEPATITIS C AB TEST: CPT

## 2021-10-28 PROCEDURE — 80061 LIPID PANEL: CPT

## 2022-09-12 ENCOUNTER — PATIENT OUTREACH (OUTPATIENT)
Dept: FAMILY MEDICINE | Facility: CLINIC | Age: 42
End: 2022-09-12

## 2022-09-12 DIAGNOSIS — R87.810 CERVICAL HIGH RISK HPV (HUMAN PAPILLOMAVIRUS) TEST POSITIVE: ICD-10-CM

## 2022-09-12 NOTE — LETTER
September 12, 2022      aRdha Navarro  [address] [place] [address]        Dear ,    This letter is to remind you that you are due for your follow-up Pap smear and Human Papillomavirus (HPV) test.    Please call [phone] to schedule your appointment at your earliest convenience.    If you have completed the appointment outside of the [facility] system, please have the records forwarded to our office. We will update your chart for your provider to review before your next annual wellness visit.     Thank you for choosing [facility]!      Sincerely,    Your [facility] Care Team

## 2022-11-14 NOTE — TELEPHONE ENCOUNTER
FYI to provider - Patient is lost to pap tracking follow-up. Attempts to contact pt have been made per reminder process and there has been no reply and/or no appt scheduled.       Emilie Allen RN, BSN      4/10/19 NIL Pap, + HR HPV 16 & other. Plan colp  4/19/19 [place] ECC Negative. Plan cotest in 1 year.     7/28/20 NIL Pap, + HR HPV 16 & other. [place] due by 10/28/20.   8/13/20 [place] visually normal; no biopsies taken. Cotest due 7/28/2021.   9/28/21 NIL Pap, + HR HPV (neg 16/18). Plan cotest in 1 year.   10/20/21 mychart not read. Paper letter sent  9/12/22 Reminder mychart/letter  10/13/22 Reminder call. Spoke to pt  11/14/22 lost to follow up

## 2023-04-23 ENCOUNTER — HEALTH MAINTENANCE LETTER (OUTPATIENT)
Age: 43
End: 2023-04-23

## 2024-02-11 ENCOUNTER — HEALTH MAINTENANCE LETTER (OUTPATIENT)
Age: 44
End: 2024-02-11

## 2024-06-30 ENCOUNTER — HEALTH MAINTENANCE LETTER (OUTPATIENT)
Age: 44
End: 2024-06-30

## 2025-05-07 ENCOUNTER — TRANSFERRED RECORDS (OUTPATIENT)
Dept: HEALTH INFORMATION MANAGEMENT | Facility: CLINIC | Age: 45
End: 2025-05-07
Payer: COMMERCIAL

## 2025-05-07 ENCOUNTER — MEDICAL CORRESPONDENCE (OUTPATIENT)
Dept: HEALTH INFORMATION MANAGEMENT | Facility: CLINIC | Age: 45
End: 2025-05-07
Payer: COMMERCIAL

## 2025-07-13 ENCOUNTER — HEALTH MAINTENANCE LETTER (OUTPATIENT)
Age: 45
End: 2025-07-13